# Patient Record
Sex: FEMALE | Race: WHITE | NOT HISPANIC OR LATINO | Employment: STUDENT | URBAN - METROPOLITAN AREA
[De-identification: names, ages, dates, MRNs, and addresses within clinical notes are randomized per-mention and may not be internally consistent; named-entity substitution may affect disease eponyms.]

---
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
## 2020-07-23 ENCOUNTER — OFFICE VISIT (OUTPATIENT)
Dept: FAMILY MEDICINE CLINIC | Facility: CLINIC | Age: 20
End: 2020-07-23

## 2020-07-23 VITALS
HEIGHT: 65 IN | WEIGHT: 119 LBS | BODY MASS INDEX: 19.83 KG/M2 | DIASTOLIC BLOOD PRESSURE: 62 MMHG | SYSTOLIC BLOOD PRESSURE: 114 MMHG | TEMPERATURE: 97.3 F | HEART RATE: 92 BPM | OXYGEN SATURATION: 99 %

## 2020-07-23 DIAGNOSIS — N89.8 VAGINAL DISCHARGE: ICD-10-CM

## 2020-07-23 DIAGNOSIS — R19.5 DARK STOOLS: ICD-10-CM

## 2020-07-23 DIAGNOSIS — Z00.00 ANNUAL PHYSICAL EXAM: Primary | ICD-10-CM

## 2020-07-23 DIAGNOSIS — K59.04 CHRONIC IDIOPATHIC CONSTIPATION: ICD-10-CM

## 2020-07-23 DIAGNOSIS — E55.9 VITAMIN D INSUFFICIENCY: ICD-10-CM

## 2020-07-23 DIAGNOSIS — N93.0 BLEEDING AFTER INTERCOURSE: ICD-10-CM

## 2020-07-23 PROCEDURE — 99395 PREV VISIT EST AGE 18-39: CPT | Performed by: FAMILY MEDICINE

## 2020-07-23 NOTE — PROGRESS NOTES
Subjective   Yessenia Gamez is a 20 y.o. female.     Chief Complaint   Patient presents with   • Annual Exam   • Gynecologic Exam       History of Present Illness   Yessenia presents for annual exam.    She is in a monogamous relationship with  for 2 years. No history of STI's.   She complains of spotting after sexual intercourse which happens consistently ever since she became sexually active two years ago.   Has had one sexual partner.   Denies dyspareunia, however she will typically experience lower abdominal pain/cramping the day after sex. Has regular monthly menstrual cycles. She is on OCP. She and her boyfriend use condoms consistently. She last saw Ob-gyn last year. Last sexual intercourse was one week ago.  She reports light yellow/clear vaginal discharge ranging from thin to thick.    She complains that her stool is very dark brown intermittently since two months ago. For example, yesterday her BM was very dark and she passed another BM that was light brown. She will pass BM every 3-4 days which has been chronic for her. She will strain at times and reports cramping and gas pains intermittently a few times per week. She will take Miralax at times which helps with the constipation.  She eats fruits and veggies in diet. Does not take fiber supplement. She takes probiotics sometimes.     She lives with her mom and dad an at [facility] during school year.     Surgical History:  [place] teeth extraction age 17 yrs    FH:  Mother- thyroid disorder  Grandfather- melanoma removed recently    Past Medical History:   Diagnosis Date   • Allergic    • Healthy adult on routine physical examination      Past Surgical History:   Procedure Laterality Date   • WISDOM TOOTH EXTRACTION  2018     Social History     Tobacco Use   • Smoking status: Never Smoker   • Smokeless tobacco: Never Used   Substance Use Topics   • Alcohol use: No     Frequency: Monthly or less   • Drug use: No     Family History   Problem Relation Age of  "Onset   • Hypothyroidism Mother    • No Known Problems Father    • Breast cancer Paternal Grandmother 65   • Breast cancer Paternal Aunt 40   • Ovarian cancer Neg Hx    • Uterine cancer Neg Hx    • Endometrial cancer Neg Hx    • Colon cancer Neg Hx        Review of Systems   Constitutional: Negative for activity change, appetite change, chills, diaphoresis, fatigue, fever, unexpected weight gain and unexpected weight loss.   Respiratory: Negative for cough, chest tightness, shortness of breath and wheezing.    Cardiovascular: Negative for chest pain, palpitations and leg swelling.   Gastrointestinal: Positive for abdominal pain, constipation (chronic) and GERD. Negative for blood in stool, diarrhea, nausea and vomiting.   Genitourinary: Positive for vaginal bleeding and vaginal discharge. Negative for dyspareunia, dysuria and menstrual problem.   Neurological: Negative for dizziness and headache.       Objective   /62   Pulse 92   Temp 97.3 °F (36.3 °C) (Temporal)   Ht 165.1 cm (65\")   Wt 54 kg (119 lb)   LMP 07/09/2020 (Approximate)   SpO2 99%   BMI 19.80 kg/m²     Physical Exam   Constitutional: She appears well-developed and well-nourished. No distress.   HENT:   Head: Normocephalic and atraumatic.   Right Ear: External ear normal.   Left Ear: External ear normal.   Mouth/Throat: Oropharynx is clear and moist. No oropharyngeal exudate.   Eyes: Conjunctivae are normal.   Neck: Normal range of motion. Neck supple.   Cardiovascular: Normal rate, regular rhythm, normal heart sounds and intact distal pulses.   Pulmonary/Chest: Effort normal and breath sounds normal. No respiratory distress. She has no wheezes. She has no rales.   Abdominal: She exhibits no distension and no mass. There is no tenderness. There is no rebound and no guarding.   Genitourinary: Vagina normal and uterus normal.   Genitourinary Comments:  On speculum exam: entire visualized cervix is friable, bloody, with significant greenish " discharge in the vaginal vault. Patient experienced moderate discomfort with exam. No cervical motion tenderness noted.   Lymphadenopathy:     She has no cervical adenopathy.   Neurological: She is alert.   Skin: Skin is warm and dry.   Psychiatric: She has a normal mood and affect.   Vitals reviewed.      Procedures    Assessment/Plan   Yessenia was seen today for annual exam and gynecologic exam.    Diagnoses and all orders for this visit:    Annual physical exam  -     Comprehensive Metabolic Panel  -     Lipid Panel    Dark stools  -     Cancel: POCT Occult blood x 3, stool  -     POCT Occult blood x 3, stool; Future    Vitamin D insufficiency  -     Vitamin D 25 Hydroxy    Vaginal discharge  -     Wet Prep, Genital - Swab, Vagina  -     Cancel: Chlamydia trachomatis, Neisseria gonorrhoeae, PCR - , Cervix  -     Chlamydia trachomatis, Neisseria gonorrhoeae, PCR - Urine, Urine, Clean Catch    Bleeding after intercourse  -     CBC & Differential  -     Cancel: Chlamydia trachomatis, Neisseria gonorrhoeae, PCR - , Cervix  -     Chlamydia trachomatis, Neisseria gonorrhoeae, PCR - Urine, Urine, Clean Catch    Chronic idiopathic constipation    Other orders  -     Specimen Status Report       Yessenia presents for annual exam today with complaint of chronic vaginal bleeding after intercourse, and vaginal discharge.   She also complains of chronic constipation and dark stools which she has noticed lately.  On exam her cervix is very friable, bloody, with greenish discharge in the vaginal vault. I am concerned about STI's.  Lab work ordered including CBC, CMP, lipids.  Wet prep, GC/Chlamydia testing sent to lab.  FOBT x 3 ordered to assess for blood in stool.   Will contact patient with results and proceed accordingly.  Will recommend HPV vaccination series and consider referral to Ob-gyn.       Patient Instructions    Lab work ordered.  Urine test and vaginal swab performed today.  FOBT ordered- please bring in samples as  discussed.  Will contact you with results and proceed accordingly.

## 2020-07-23 NOTE — PATIENT INSTRUCTIONS
Patient Instructions    Lab work ordered.  Urine test and vaginal swab performed today.  FOBT ordered- please bring in samples as discussed.  Will contact you with results and proceed accordingly.

## 2020-07-24 LAB
25(OH)D3+25(OH)D2 SERPL-MCNC: 44.6 NG/ML (ref 30–100)
ALBUMIN SERPL-MCNC: 4.9 G/DL (ref 3.5–5.2)
ALBUMIN/GLOB SERPL: 1.7 G/DL
ALP SERPL-CCNC: 47 U/L (ref 39–117)
ALT SERPL-CCNC: 9 U/L (ref 1–33)
AST SERPL-CCNC: 12 U/L (ref 1–32)
BASOPHILS # BLD AUTO: 0.04 10*3/MM3 (ref 0–0.2)
BASOPHILS NFR BLD AUTO: 0.6 % (ref 0–1.5)
BILIRUB SERPL-MCNC: 0.2 MG/DL (ref 0–1.2)
BUN SERPL-MCNC: 10 MG/DL (ref 6–20)
BUN/CREAT SERPL: 11.9 (ref 7–25)
CALCIUM SERPL-MCNC: 9.9 MG/DL (ref 8.6–10.5)
CHLORIDE SERPL-SCNC: 103 MMOL/L (ref 98–107)
CHOLEST SERPL-MCNC: 170 MG/DL (ref 0–200)
CLUE CELLS SPEC QL WET PREP: NORMAL
CO2 SERPL-SCNC: 26.7 MMOL/L (ref 22–29)
CREAT SERPL-MCNC: 0.84 MG/DL (ref 0.57–1)
EOSINOPHIL # BLD AUTO: 0.16 10*3/MM3 (ref 0–0.4)
EOSINOPHIL NFR BLD AUTO: 2.4 % (ref 0.3–6.2)
ERYTHROCYTE [DISTWIDTH] IN BLOOD BY AUTOMATED COUNT: 11.8 % (ref 12.3–15.4)
GLOBULIN SER CALC-MCNC: 2.9 GM/DL
GLUCOSE SERPL-MCNC: 93 MG/DL (ref 65–99)
HCT VFR BLD AUTO: 44.1 % (ref 34–46.6)
HDLC SERPL-MCNC: 40 MG/DL (ref 40–60)
HGB BLD-MCNC: 14.5 G/DL (ref 12–15.9)
IMM GRANULOCYTES # BLD AUTO: 0.03 10*3/MM3 (ref 0–0.05)
IMM GRANULOCYTES NFR BLD AUTO: 0.4 % (ref 0–0.5)
LDLC SERPL CALC-MCNC: 113 MG/DL (ref 0–100)
LYMPHOCYTES # BLD AUTO: 1.91 10*3/MM3 (ref 0.7–3.1)
LYMPHOCYTES NFR BLD AUTO: 28.6 % (ref 19.6–45.3)
MCH RBC QN AUTO: 28.2 PG (ref 26.6–33)
MCHC RBC AUTO-ENTMCNC: 32.9 G/DL (ref 31.5–35.7)
MCV RBC AUTO: 85.6 FL (ref 79–97)
MONOCYTES # BLD AUTO: 0.59 10*3/MM3 (ref 0.1–0.9)
MONOCYTES NFR BLD AUTO: 8.8 % (ref 5–12)
NEUTROPHILS # BLD AUTO: 3.96 10*3/MM3 (ref 1.7–7)
NEUTROPHILS NFR BLD AUTO: 59.2 % (ref 42.7–76)
NRBC BLD AUTO-RTO: 0 /100 WBC (ref 0–0.2)
PLATELET # BLD AUTO: 323 10*3/MM3 (ref 140–450)
POTASSIUM SERPL-SCNC: 5.4 MMOL/L (ref 3.5–5.2)
PROT SERPL-MCNC: 7.8 G/DL (ref 6–8.5)
RBC # BLD AUTO: 5.15 10*6/MM3 (ref 3.77–5.28)
SODIUM SERPL-SCNC: 139 MMOL/L (ref 136–145)
SPECIMEN STATUS: NORMAL
T VAGINALIS SPEC QL WET PREP: NORMAL
TRIGL SERPL-MCNC: 87 MG/DL (ref 0–150)
VLDLC SERPL CALC-MCNC: 17.4 MG/DL
WBC # BLD AUTO: 6.69 10*3/MM3 (ref 3.4–10.8)
YEAST SPEC QL WET PREP: NORMAL

## 2020-07-25 LAB
C TRACH RRNA SPEC QL NAA+PROBE: NEGATIVE
N GONORRHOEA RRNA SPEC QL NAA+PROBE: NEGATIVE

## 2020-07-27 ENCOUNTER — TELEPHONE (OUTPATIENT)
Dept: FAMILY MEDICINE CLINIC | Facility: CLINIC | Age: 20
End: 2020-07-27

## 2020-07-27 DIAGNOSIS — Z00.00 HEALTHCARE MAINTENANCE: ICD-10-CM

## 2020-07-27 NOTE — TELEPHONE ENCOUNTER
Pt has been called and advised was tested a different was through urine and not swab. Pt understood and will await results.

## 2020-07-27 NOTE — TELEPHONE ENCOUNTER
Caller: Yessenia Gamez    Relationship: Self    Best call back number: [phone]    Medication needed:   Requested Prescriptions     Pending Prescriptions Disp Refills   • norethindrone-ethinyl estradiol (Nortrel 1/35, 28,) 1-35 MG-MCG per tablet 28 tablet 12     Sig: Take 1 tablet by mouth Daily.       When do you need the refill by: W/IN MONTH    What details did the patient provide when requesting the medication: PT WAS SEEN Thursday, BUT FORGOT TO REQUEST A NEW PRESCRIPTION FOR HER BIRTH CONTROL. PLEASE SEND OVER.    Does the patient have less than a 3 day supply:  [] Yes  [x] No    What is the patient's preferred pharmacy:      [facility] - [facility] [address] [place] [address] [phone] [facility] [phone]   [phone]

## 2020-07-27 NOTE — TELEPHONE ENCOUNTER
Caller: ZO COPELAND    Relationship: SELF    Best call back number: [phone]      What test was performed: VAGINAL SWAB    When was the test performed: THURSDAY    Where was the test performed: IN OFFICE    Additional notes: RESULTS CANCELLED IN MYCHART, PLEASE CHECK ON RESULTS FROM SWAB AND CALL TO ADVISE PT

## 2020-07-29 ENCOUNTER — TELEPHONE (OUTPATIENT)
Dept: FAMILY MEDICINE CLINIC | Facility: CLINIC | Age: 20
End: 2020-07-29

## 2020-07-29 DIAGNOSIS — R19.5 DARK STOOLS: Primary | ICD-10-CM

## 2020-07-29 PROCEDURE — 82274 ASSAY TEST FOR BLOOD FECAL: CPT | Performed by: FAMILY MEDICINE

## 2020-07-29 NOTE — PROGRESS NOTES
Spoke with patient regarding test results. The lab cancelled the wet prep- I am unsure why because the correct tube was sent. Wet prep is being done now and results should be ready tomorrow. All other labs are normal/negative. Will contact patient with lab results. Will plan to place Ob-gyn referral at that time. She is on board with this plan.

## 2020-07-31 DIAGNOSIS — N93.0 PCB (POST COITAL BLEEDING): ICD-10-CM

## 2020-07-31 DIAGNOSIS — N89.8 VAGINAL DISCHARGE: Primary | ICD-10-CM

## 2020-08-01 LAB
A VAGINAE DNA VAG QL NAA+PROBE: NORMAL SCORE
BVAB2 DNA VAG QL NAA+PROBE: NORMAL SCORE
C ALBICANS DNA VAG QL NAA+PROBE: NEGATIVE
C GLABRATA DNA VAG QL NAA+PROBE: NEGATIVE
MEGA1 DNA VAG QL NAA+PROBE: NORMAL SCORE
T VAGINALIS DNA VAG QL NAA+PROBE: NEGATIVE
WRITTEN AUTHORIZATION: NORMAL

## 2020-08-26 ENCOUNTER — CLINICAL SUPPORT (OUTPATIENT)
Dept: FAMILY MEDICINE CLINIC | Facility: CLINIC | Age: 20
End: 2020-08-26

## 2020-08-26 ENCOUNTER — TELEPHONE (OUTPATIENT)
Dept: FAMILY MEDICINE CLINIC | Facility: CLINIC | Age: 20
End: 2020-08-26

## 2020-08-26 DIAGNOSIS — R19.5 DARK STOOLS: Primary | ICD-10-CM

## 2020-08-26 NOTE — PROGRESS NOTES
2nd IFOB received. Negative. Waiting for last IFOB. Left open to edit.   Lot number: [id number]   Exp: 4/30/2021

## 2020-09-08 LAB
EXPIRATION DATE 2: NORMAL
EXPIRATION DATE 3: NORMAL
EXPIRATION DATE: NORMAL
GASTROCULT GAST QL: NEGATIVE
HEMOCCULT SP2 STL QL: NEGATIVE
HEMOCCULT SP3 STL QL: NEGATIVE
Lab: NORMAL

## 2020-10-16 ENCOUNTER — OFFICE VISIT (OUTPATIENT)
Dept: FAMILY MEDICINE CLINIC | Facility: CLINIC | Age: 20
End: 2020-10-16

## 2020-10-16 VITALS
SYSTOLIC BLOOD PRESSURE: 130 MMHG | DIASTOLIC BLOOD PRESSURE: 80 MMHG | TEMPERATURE: 97.3 F | HEIGHT: 63 IN | OXYGEN SATURATION: 99 % | RESPIRATION RATE: 16 BRPM | HEART RATE: 107 BPM | BODY MASS INDEX: 21.44 KG/M2 | WEIGHT: 121 LBS

## 2020-10-16 DIAGNOSIS — R10.30 LOWER ABDOMINAL PAIN: Primary | ICD-10-CM

## 2020-10-16 DIAGNOSIS — N39.0 RECURRENT UTI: ICD-10-CM

## 2020-10-16 DIAGNOSIS — N30.01 ACUTE CYSTITIS WITH HEMATURIA: ICD-10-CM

## 2020-10-16 LAB
BILIRUB BLD-MCNC: NEGATIVE MG/DL
CLARITY, POC: ABNORMAL
COLOR UR: ABNORMAL
GLUCOSE UR STRIP-MCNC: NEGATIVE MG/DL
KETONES UR QL: NEGATIVE
LEUKOCYTE EST, POC: ABNORMAL
NITRITE UR-MCNC: NEGATIVE MG/ML
PH UR: 6 [PH] (ref 5–8)
PROT UR STRIP-MCNC: NEGATIVE MG/DL
RBC # UR STRIP: ABNORMAL /UL
SP GR UR: 1.02 (ref 1–1.03)
UROBILINOGEN UR QL: NORMAL

## 2020-10-16 PROCEDURE — 81003 URINALYSIS AUTO W/O SCOPE: CPT | Performed by: FAMILY MEDICINE

## 2020-10-16 PROCEDURE — 99213 OFFICE O/P EST LOW 20 MIN: CPT | Performed by: FAMILY MEDICINE

## 2020-10-16 RX ORDER — SULFAMETHOXAZOLE AND TRIMETHOPRIM 800; 160 MG/1; MG/1
1 TABLET ORAL 2 TIMES DAILY
Qty: 10 TABLET | Refills: 0 | Status: SHIPPED | OUTPATIENT
Start: 2020-10-16 | End: 2020-10-21

## 2020-10-16 NOTE — PATIENT INSTRUCTIONS
Patient Instructions    Urine culture ordered- will contact you with results.  Bactrim prescribed.  Referral placed for urology- office will call you with results.  Call or return to clinic if symptoms worsen or fail to resolve.

## 2020-10-16 NOTE — PROGRESS NOTES
"Subjective   Yessenia Gamez is a 20 y.o. female.     Chief Complaint   Patient presents with   • Abdominal Pain     lower abdominal pain- patient thinks she may have a UTI        History of Present Illness   Pain started this morning at 8am, came on all of a sudden after she urinated this morning. Began in the lower abdomen- similar to recent UTI symptoms for which she was treated 9/30. Sharp pain at 6-7/10. Since then The pain has become more diffuse, dull and including achey low back pain.   After leaving urine sample in the office she feels a lot better, not completely but feels relief. Denies dysuria, gross hematuria. She has been trying to drink more because she gets UTI's frequently (5-6 x per year)- for the past 2-3 years- however she usually has dysuria, urgency except not now or with the last UTI.   She is sexually active with her boyfriend- for the past 6 years. She takes OCP, and he uses condom consistently. No history of STI's. No vaginal discharge.  She urinates after intercourse regularly.   She had to reschedule her apt with Ob-gyn.  She is not menstruating currently- should start in 5 days.   No fevers. Before this morning she was feeling \"perfectly normal\".   Previous urine cultures from last year reviewed and were negative.     Past Medical History:   Diagnosis Date   • Allergic    • Healthy adult on routine physical examination      Past Surgical History:   Procedure Laterality Date   • WISDOM TOOTH EXTRACTION  2018     Social History     Tobacco Use   • Smoking status: Never Smoker   • Smokeless tobacco: Never Used   Substance Use Topics   • Alcohol use: No     Frequency: Monthly or less   • Drug use: No     Family History   Problem Relation Age of Onset   • Hypothyroidism Mother    • No Known Problems Father    • Breast cancer Paternal Grandmother 65   • Breast cancer Paternal Aunt 40   • Ovarian cancer Neg Hx    • Uterine cancer Neg Hx    • Endometrial cancer Neg Hx    • Colon cancer Neg Hx  " "      Review of Systems   Constitutional: Negative for activity change, appetite change, chills, fatigue, fever, unexpected weight gain and unexpected weight loss.   HENT: Negative for trouble swallowing.    Respiratory: Negative for cough, chest tightness, shortness of breath and wheezing.    Cardiovascular: Negative for chest pain and leg swelling.   Gastrointestinal: Negative for abdominal pain, constipation, diarrhea, nausea and vomiting.   Genitourinary: Positive for flank pain, pelvic pain and pelvic pressure. Negative for difficulty urinating, dyspareunia, dysuria, frequency, genital sores, hematuria, menstrual problem, urgency and vaginal discharge.       Objective   /80   Pulse 107   Temp 97.3 °F (36.3 °C)   Resp 16   Ht 160 cm (63\")   Wt 54.9 kg (121 lb)   LMP 09/21/2020   SpO2 99%   BMI 21.43 kg/m²     Physical Exam  Vitals signs reviewed.   Constitutional:       General: She is not in acute distress.     Appearance: Normal appearance. She is well-developed and normal weight. She is not ill-appearing.   HENT:      Head: Normocephalic and atraumatic.      Right Ear: External ear normal.      Left Ear: External ear normal.      Nose: Nose normal.      Mouth/Throat:      Mouth: Mucous membranes are moist.      Pharynx: Oropharynx is clear. No oropharyngeal exudate.   Eyes:      Conjunctiva/sclera: Conjunctivae normal.   Neck:      Musculoskeletal: Normal range of motion and neck supple.   Cardiovascular:      Rate and Rhythm: Normal rate and regular rhythm.      Heart sounds: Normal heart sounds.   Pulmonary:      Effort: Pulmonary effort is normal. No respiratory distress.      Breath sounds: Normal breath sounds. No wheezing, rhonchi or rales.   Abdominal:      General: Abdomen is flat. Bowel sounds are normal. There is no distension.      Palpations: Abdomen is soft.      Tenderness: There is no abdominal tenderness. There is no right CVA tenderness, left CVA tenderness, guarding or rebound. "   Musculoskeletal:      Right lower leg: No edema.      Left lower leg: No edema.   Skin:     General: Skin is warm and dry.   Neurological:      Mental Status: She is alert.   Psychiatric:         Mood and Affect: Mood normal.         Procedures    Assessment/Plan   Diagnoses and all orders for this visit:    1. Lower abdominal pain (Primary)  -     POC Urinalysis Dipstick, Automated    2. Acute cystitis with hematuria  -     sulfamethoxazole-trimethoprim (Bactrim DS) 800-160 MG per tablet; Take 1 tablet by mouth 2 (Two) Times a Day for 5 days.  Dispense: 10 tablet; Refill: 0  -     Urinalysis With Microscopic - Urine, Clean Catch  -     Urine Culture - Urine, Urine, Clean Catch    3. Recurrent UTI  -     Ambulatory Referral to Urology    Urine dip stick in office- trace leukocytes and trace blood- not convinced of infection. Upon review of previous urine cultures- no positive culture available. I wonder if she may have interstitial cystitis.  Urine culture ordered- will contact patient with results.  Bactrim prescribed.  Referral placed for urology- assistance appreciated.  Instructed patient to call or return to clinic if symptoms worsen or fail to resolve.        Patient Instructions    Urine culture ordered- will contact you with results.  Bactrim prescribed.  Referral placed for urology- office will call you with results.  Call or return to clinic if symptoms worsen or fail to resolve.

## 2020-10-18 LAB
APPEARANCE UR: CLEAR
BACTERIA #/AREA URNS HPF: ABNORMAL /HPF
BACTERIA UR CULT: NO GROWTH
BACTERIA UR CULT: NORMAL
BILIRUB UR QL STRIP: NEGATIVE
COLOR UR: YELLOW
EPI CELLS #/AREA URNS HPF: ABNORMAL /HPF
GLUCOSE UR QL: NEGATIVE
HGB UR QL STRIP: NEGATIVE
KETONES UR QL STRIP: NEGATIVE
LEUKOCYTE ESTERASE UR QL STRIP: ABNORMAL
NITRITE UR QL STRIP: NEGATIVE
PH UR STRIP: 6 [PH] (ref 5–8)
PROT UR QL STRIP: NEGATIVE
RBC #/AREA URNS HPF: ABNORMAL /HPF
SP GR UR: 1.01 (ref 1–1.03)
UROBILINOGEN UR STRIP-MCNC: ABNORMAL MG/DL
WBC #/AREA URNS HPF: ABNORMAL /HPF

## 2020-10-26 NOTE — PROGRESS NOTES
Peg,     Could you please call Yessenia and let her know her urinalysis was negative for UTI? How is she feeling currently? Thank you!

## 2021-04-16 ENCOUNTER — BULK ORDERING (OUTPATIENT)
Dept: CASE MANAGEMENT | Facility: OTHER | Age: 21
End: 2021-04-16

## 2021-04-16 DIAGNOSIS — Z23 IMMUNIZATION DUE: ICD-10-CM

## 2021-05-21 PROCEDURE — 87086 URINE CULTURE/COLONY COUNT: CPT | Performed by: NURSE PRACTITIONER

## 2021-05-24 ENCOUNTER — TELEPHONE (OUTPATIENT)
Dept: URGENT CARE | Facility: CLINIC | Age: 21
End: 2021-05-24

## 2021-05-25 ENCOUNTER — TELEPHONE (OUTPATIENT)
Dept: URGENT CARE | Facility: CLINIC | Age: 21
End: 2021-05-25

## 2021-05-31 ENCOUNTER — HOSPITAL ENCOUNTER (EMERGENCY)
Facility: HOSPITAL | Age: 21
Discharge: HOME | End: 2021-05-31
Attending: EMERGENCY MEDICINE | Admitting: EMERGENCY MEDICINE

## 2021-05-31 VITALS
SYSTOLIC BLOOD PRESSURE: 104 MMHG | TEMPERATURE: 98.3 F | OXYGEN SATURATION: 100 % | RESPIRATION RATE: 18 BRPM | HEART RATE: 88 BPM | DIASTOLIC BLOOD PRESSURE: 66 MMHG

## 2021-05-31 DIAGNOSIS — L50.9 URTICARIA: Primary | ICD-10-CM

## 2021-05-31 PROCEDURE — 99282 EMERGENCY DEPT VISIT SF MDM: CPT

## 2021-05-31 PROCEDURE — 25010000002 METHYLPREDNISOLONE PER 125 MG: Performed by: EMERGENCY MEDICINE

## 2021-05-31 PROCEDURE — 96374 THER/PROPH/DIAG INJ IV PUSH: CPT

## 2021-05-31 RX ORDER — METHYLPREDNISOLONE 4 MG/1
TABLET ORAL
Qty: 21 TABLET | Refills: 0 | Status: SHIPPED | OUTPATIENT
Start: 2021-05-31 | End: 2021-12-06

## 2021-05-31 RX ORDER — METHYLPREDNISOLONE 4 MG/1
TABLET ORAL
Qty: 21 TABLET | Refills: 0 | Status: SHIPPED | OUTPATIENT
Start: 2021-05-31 | End: 2021-05-31 | Stop reason: SDUPTHER

## 2021-05-31 RX ORDER — METHYLPREDNISOLONE SODIUM SUCCINATE 125 MG/2ML
125 INJECTION, POWDER, LYOPHILIZED, FOR SOLUTION INTRAMUSCULAR; INTRAVENOUS ONCE
Status: COMPLETED | OUTPATIENT
Start: 2021-05-31 | End: 2021-05-31

## 2021-05-31 RX ADMIN — METHYLPREDNISOLONE SODIUM SUCCINATE 125 MG: 125 INJECTION, POWDER, FOR SOLUTION INTRAMUSCULAR; INTRAVENOUS at 12:54

## 2021-07-29 ENCOUNTER — TELEPHONE (OUTPATIENT)
Dept: OBSTETRICS AND GYNECOLOGY | Facility: CLINIC | Age: 21
End: 2021-07-29

## 2021-07-29 DIAGNOSIS — Z00.00 HEALTHCARE MAINTENANCE: ICD-10-CM

## 2021-07-29 RX ORDER — NORETHINDRONE AND ETHINYL ESTRADIOL 1 MG-35MCG
1 KIT ORAL DAILY
Qty: 28 TABLET | Refills: 0 | Status: SHIPPED | OUTPATIENT
Start: 2021-07-29 | End: 2021-08-27 | Stop reason: SDUPTHER

## 2021-07-30 NOTE — TELEPHONE ENCOUNTER
Attempted to contact patient and there was no answer.  Message was left for her to give the office a call back.

## 2021-08-27 DIAGNOSIS — Z00.00 HEALTHCARE MAINTENANCE: ICD-10-CM

## 2021-08-27 RX ORDER — NORETHINDRONE AND ETHINYL ESTRADIOL 1 MG-35MCG
1 KIT ORAL DAILY
Qty: 28 TABLET | Refills: 2 | Status: SHIPPED | OUTPATIENT
Start: 2021-08-27 | End: 2021-09-24 | Stop reason: SDUPTHER

## 2021-08-27 NOTE — TELEPHONE ENCOUNTER
Patient called and is needing a refill on her birth control sent to her pharmacy.  She has an appointment scheduled in December.

## 2021-09-24 ENCOUNTER — TELEPHONE (OUTPATIENT)
Dept: OBSTETRICS AND GYNECOLOGY | Facility: CLINIC | Age: 21
End: 2021-09-24

## 2021-09-24 DIAGNOSIS — Z00.00 HEALTHCARE MAINTENANCE: ICD-10-CM

## 2021-09-24 RX ORDER — NORETHINDRONE AND ETHINYL ESTRADIOL 1 MG-35MCG
1 KIT ORAL DAILY
Qty: 28 TABLET | Refills: 2 | Status: SHIPPED | OUTPATIENT
Start: 2021-09-24 | End: 2021-10-22 | Stop reason: SDUPTHER

## 2021-10-22 RX ORDER — NORETHINDRONE AND ETHINYL ESTRADIOL 1 MG-35MCG
1 KIT ORAL DAILY
Qty: 28 TABLET | Refills: 2 | Status: SHIPPED | OUTPATIENT
Start: 2021-10-22 | End: 2021-11-19 | Stop reason: SDUPTHER

## 2021-11-19 ENCOUNTER — TELEPHONE (OUTPATIENT)
Dept: OBSTETRICS AND GYNECOLOGY | Facility: CLINIC | Age: 21
End: 2021-11-19

## 2021-11-19 DIAGNOSIS — Z00.00 HEALTHCARE MAINTENANCE: ICD-10-CM

## 2021-11-19 RX ORDER — NORETHINDRONE AND ETHINYL ESTRADIOL 1 MG-35MCG
1 KIT ORAL DAILY
Qty: 28 TABLET | Refills: 0 | Status: SHIPPED | OUTPATIENT
Start: 2021-11-19 | End: 2021-12-06 | Stop reason: ALTCHOICE

## 2021-11-19 NOTE — TELEPHONE ENCOUNTER
Patient was calling to have her birth control refilled.  She is scheduled in Dec but needs some to carry her over until she sees the provider in office.  If any questions you can contact patient.     norethindrone-ethinyl estradiol

## 2021-12-06 ENCOUNTER — OFFICE VISIT (OUTPATIENT)
Dept: OBSTETRICS AND GYNECOLOGY | Facility: CLINIC | Age: 21
End: 2021-12-06

## 2021-12-06 VITALS
SYSTOLIC BLOOD PRESSURE: 110 MMHG | RESPIRATION RATE: 16 BRPM | WEIGHT: 129 LBS | DIASTOLIC BLOOD PRESSURE: 60 MMHG | BODY MASS INDEX: 22.85 KG/M2

## 2021-12-06 DIAGNOSIS — Z20.2 POSSIBLE EXPOSURE TO STD: ICD-10-CM

## 2021-12-06 DIAGNOSIS — Z30.41 ENCOUNTER FOR SURVEILLANCE OF CONTRACEPTIVE PILLS: ICD-10-CM

## 2021-12-06 DIAGNOSIS — Z01.419 ENCOUNTER FOR WELL WOMAN EXAM WITH ROUTINE GYNECOLOGICAL EXAM: Primary | ICD-10-CM

## 2021-12-06 PROCEDURE — 99395 PREV VISIT EST AGE 18-39: CPT | Performed by: NURSE PRACTITIONER

## 2021-12-06 RX ORDER — AZELASTINE HCL 205.5 UG/1
SPRAY NASAL
COMMUNITY
Start: 2021-11-04

## 2021-12-06 NOTE — PROGRESS NOTES
Annual Visit     Patient Name: Yessenia Gamez  : 2000   MRN: [id number]   Care Team: Patient Care Team:  Alena Olvera MD as PCP - General (Family Medicine)  Provider, No Known as PCP - Family Medicine  Deb Yadav DO (Inactive) as Consulting Physician (Obstetrics and Gynecology)    Chief Complaint:    Chief Complaint   Patient presents with   • Annual Exam       HPI: Yessenia Gamez is a 21 y.o. year old  presenting to be seen for her gynecologic exam.   Hasn't had a pap smear yet     Doing well with nortrel 1/35 COCs except she has noticed some vaginal dryness with method   Asking if there would be other pill options that might be a better fit for her     Attending [employer] - will graduate in may 2022   Plans to attend medical school at  after     Subjective      /60   Resp 16   Wt 58.5 kg (129 lb)   LMP 2021   BMI 22.85 kg/m²     BMI reviewed: Body mass index is 22.85 kg/m².      Objective     Physical Exam    Neuro: alert and oriented to person, place and time   General:  alert; cooperative; well developed; well nourished   Skin:  No suspicious lesions seen   Thyroid: normal to inspection and palpation   Lungs:  breathing is unlabored  clear to auscultation bilaterally   Heart:  regular rate and rhythm, S1, S2 normal, no murmur, click, rub or gallop  normal apical impulse   Breasts:  Examined in supine position  Symmetric without masses or skin dimpling  Nipples normal without inversion, lesions or discharge  There are no palpable axillary nodes  Fibrocystic changes are present both breasts without a discrete mass   Abdomen: soft, non-tender; no masses  no umbilical or inguinal hernias are present  no hepato-splenomegaly   Pelvis: Clinical staff was present for exam  External genitalia:  normal appearance of the external genitalia including Bartholin's and [place]'s glands.  :  urethral meatus normal;  Vaginal:  normal pink mucosa without prolapse or lesions.  Cervix:   normal appearance. friable;  Uterus:  normal size, shape and consistency.  Adnexa:  normal bimanual exam of the adnexa.  Rectal:  digital rectal exam not performed; anus visually normal appearing.     Cervix treated with silver nitrate   She does report postcoital spotting    Assessment / Plan      Assessment  Problems Addressed This Visit    ICD-10-CM ICD-9-CM   1. Encounter for well woman exam with routine gynecological exam  Z01.419 V72.31   2. Possible exposure to STD  Z20.2 V01.6   3. Encounter for surveillance of contraceptive pills  Z30.41 V25.41       Plan    Pap smear pending   STD screening pending   Discussed cervix treated with silver nitrate d/t friability   If postcoital bldg continues, she will let me know     No C/is to cont COCs   She will complete current pill pkg then trial of Balcoltra - samples x 2 given   She will call to let me know how she is doing before 2nd month completed   Reviewed expected bldg pattern with method   Discussed vaginal dryness may worsen with lower dose of estrogen, but she desires to cont with trial   Lubricants with intercourse if needed     AV 1 yr             Follow Up  Return in about 1 year (around 12/6/2022) for Annual physical.  Patient was given instructions and counseling regarding her condition or for health maintenance advice. Please see specific information pulled into the AVS if appropriate.     Jennifer Vázquez, VÍCTOR  December 6, 2021  15:18 EST

## 2021-12-07 ENCOUNTER — TELEPHONE (OUTPATIENT)
Dept: OBSTETRICS AND GYNECOLOGY | Facility: CLINIC | Age: 21
End: 2021-12-07

## 2022-02-07 ENCOUNTER — TELEPHONE (OUTPATIENT)
Dept: OBSTETRICS AND GYNECOLOGY | Facility: CLINIC | Age: 22
End: 2022-02-07

## 2022-02-07 NOTE — TELEPHONE ENCOUNTER
PT CALLING WAS GIVEN SAMPLE OF B/C ON LAST VISIT - AND REALLY LIKES THEM AND IS ASKING FOR A PRESCRIPTION TO BE CALLED IN -PLEASE ADVISE HER IF UNABLE

## 2022-02-08 ENCOUNTER — TELEPHONE (OUTPATIENT)
Dept: OBSTETRICS AND GYNECOLOGY | Facility: CLINIC | Age: 22
End: 2022-02-08

## 2022-02-08 RX ORDER — LEVONORGESTREL AND ETHINYL ESTRADIOL 0.1-0.02MG
1 KIT ORAL DAILY
Qty: 28 TABLET | Refills: 12 | Status: SHIPPED | OUTPATIENT
Start: 2022-02-08 | End: 2022-10-11

## 2022-04-15 ENCOUNTER — OFFICE VISIT (OUTPATIENT)
Dept: FAMILY MEDICINE CLINIC | Facility: CLINIC | Age: 22
End: 2022-04-15

## 2022-04-15 VITALS
DIASTOLIC BLOOD PRESSURE: 64 MMHG | SYSTOLIC BLOOD PRESSURE: 100 MMHG | HEIGHT: 63 IN | BODY MASS INDEX: 22.79 KG/M2 | TEMPERATURE: 97.7 F | WEIGHT: 128.6 LBS | HEART RATE: 86 BPM | OXYGEN SATURATION: 99 %

## 2022-04-15 DIAGNOSIS — Z11.9 ENCOUNTER FOR SCREENING EXAMINATION FOR INFECTIOUS DISEASE: ICD-10-CM

## 2022-04-15 DIAGNOSIS — Z11.59 NEED FOR HEPATITIS B SCREENING TEST: ICD-10-CM

## 2022-04-15 DIAGNOSIS — Z11.1 SCREENING FOR TUBERCULOSIS: ICD-10-CM

## 2022-04-15 DIAGNOSIS — Z23 ENCOUNTER FOR IMMUNIZATION: ICD-10-CM

## 2022-04-15 DIAGNOSIS — Z11.59 ENCOUNTER FOR HEPATITIS C SCREENING TEST FOR LOW RISK PATIENT: ICD-10-CM

## 2022-04-15 DIAGNOSIS — Z00.00 ANNUAL PHYSICAL EXAM: Primary | ICD-10-CM

## 2022-04-15 PROCEDURE — 90471 IMMUNIZATION ADMIN: CPT | Performed by: NURSE PRACTITIONER

## 2022-04-15 PROCEDURE — 90715 TDAP VACCINE 7 YRS/> IM: CPT | Performed by: NURSE PRACTITIONER

## 2022-04-15 PROCEDURE — 90472 IMMUNIZATION ADMIN EACH ADD: CPT | Performed by: NURSE PRACTITIONER

## 2022-04-15 PROCEDURE — 99395 PREV VISIT EST AGE 18-39: CPT | Performed by: NURSE PRACTITIONER

## 2022-04-15 PROCEDURE — 90651 9VHPV VACCINE 2/3 DOSE IM: CPT | Performed by: NURSE PRACTITIONER

## 2022-04-15 RX ORDER — AMOXICILLIN 875 MG/1
TABLET, COATED ORAL
COMMUNITY
Start: 2022-04-11 | End: 2022-10-11

## 2022-04-15 RX ORDER — CETIRIZINE HYDROCHLORIDE 10 MG/1
10 TABLET ORAL DAILY
COMMUNITY

## 2022-04-15 NOTE — PROGRESS NOTES
"Chief Complaint  Establish Care and ear infection (On antibiotics since monday)    Subjective          Yessenia Gamez presents to [facility]  Yessenia presents to establish care. She was diagnosed with an ear infection on Monday and started amoxicillin. She states she is toelerating this well, however both ears are still bothering her. She is taking zyrtec, astelin prn, and flonase and tolerating those well. Increased headaches related to allergies more recently.     She is established with gyn and pap is up to date. She states she was advised she had a friable cervix. She is taking OCP and tolerating well, prescribed by gyn.     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 She is required to have vaccines up to date. She is due HPV, she had one vaccine in the past and would like to finish this series. She is also due a tdap and agreeable to get this today. She needs proof of immunity to Hep B, MMR, varicella and also needs screening for TB.   Earache   There is pain in both ears. This is a new problem. The current episode started in the past 7 days. The problem has been gradually improving. There has been no fever. The pain is mild. Associated symptoms include headaches and rhinorrhea. Pertinent negatives include no abdominal pain, coughing, diarrhea, ear discharge, hearing loss, neck pain, rash, sore throat or vomiting. She has tried antibiotics for the symptoms. The treatment provided mild relief.       Objective   Vital Signs:   /64 (BP Location: Left arm, Patient Position: Sitting, Cuff Size: Adult)   Pulse 86   Temp 97.7 °F (36.5 °C) (Temporal)   Ht 160 cm (62.99\")   Wt 58.3 kg (128 lb 9.6 oz)   SpO2 99%   BMI 22.79 kg/m²     BMI is within normal parameters. No follow-up required.      Physical Exam  Vitals reviewed.   HENT:      Right Ear: A middle ear effusion is present. Tympanic membrane is not erythematous or " bulging.      Left Ear: A middle ear effusion is present. Tympanic membrane is not erythematous or bulging.      Nose: Nose normal.      Mouth/Throat:      Mouth: Mucous membranes are moist.      Pharynx: Oropharynx is clear.   Eyes:      Conjunctiva/sclera: Conjunctivae normal.      Pupils: Pupils are equal, round, and reactive to light.   Pulmonary:      Effort: Pulmonary effort is normal. No respiratory distress.      Breath sounds: Normal breath sounds. No wheezing, rhonchi or rales.   Abdominal:      General: Bowel sounds are normal. There is no distension.      Palpations: Abdomen is soft.      Tenderness: There is no abdominal tenderness.      Hernia: No hernia is present.   Skin:     General: Skin is warm and dry.   Neurological:      Mental Status: She is oriented to person, place, and time.   Psychiatric:         Mood and Affect: Mood normal.        Result Review :                 Assessment and Plan    Diagnoses and all orders for this visit:    1. Annual physical exam (Primary)    2. Encounter for hepatitis C screening test for low risk patient  -     Hepatitis C Antibody    3. Screening for tuberculosis  -     QuantiFERON TB Gold    4. Encounter for screening examination for infectious disease  -     Varicella Zoster Antibodies, IgG / IgM  -     Measles / Mumps / Rubella Immunity    5. Need for hepatitis B screening test  -     Hepatitis B Surface Antibody    6. Encounter for immunization  -     HPV Vaccine (HPV9)    Other orders  -     Tdap Vaccine Greater Than or Equal To 8yo IM        Follow Up   No follow-ups on file.  Patient was given instructions and counseling regarding her condition or for health maintenance advice. Please see specific information pulled into the AVS if appropriate.       Information/counseling provided to the patient regarding periodic lonny maintenance recommendations, including but not limited to immunizations, diet/exercise/healthy lifestyle, laboratory, and other  screenings. BMI is discussed. Appropriate exercise, diet, and weight plans are discussed.    Pap is up to date    hpv vax and tdap today  Will monitor antibodies in Hep B, varicella and MMR  quantiferon as ordered    Otalgia: no evidence of infection, recommend to continue abx, there is moderate fluid level, however she will continue flonase and advised this times time to resolve  She will follow up as needed

## 2022-04-19 LAB
GAMMA INTERFERON BACKGROUND BLD IA-ACNC: 0.02 IU/ML
HBV SURFACE AB SER QL: NON REACTIVE
HCV AB S/CO SERPL IA: <0.1 S/CO RATIO (ref 0–0.9)
M TB IFN-G BLD-IMP: NEGATIVE
M TB IFN-G CD4+ BCKGRND COR BLD-ACNC: 0.05 IU/ML
M TB IFN-G CD4+CD8+ BCKGRND COR BLD-ACNC: 0.03 IU/ML
MEV IGG SER IA-ACNC: >300 AU/ML
MITOGEN IGNF BLD-ACNC: >10 IU/ML
MUV IGG SER IA-ACNC: 228 AU/ML
QUANTIFERON INCUBATION: NORMAL
RUBV IGG SERPL IA-ACNC: 9.55 INDEX
SERVICE CMNT-IMP: NORMAL
VZV IGG SER IA-ACNC: 1967 INDEX
VZV IGM SER IA-ACNC: <0.91 INDEX (ref 0–0.9)

## 2022-05-05 ENCOUNTER — TELEPHONE (OUTPATIENT)
Dept: FAMILY MEDICINE CLINIC | Facility: CLINIC | Age: 22
End: 2022-05-05

## 2022-05-05 ENCOUNTER — CLINICAL SUPPORT (OUTPATIENT)
Dept: FAMILY MEDICINE CLINIC | Facility: CLINIC | Age: 22
End: 2022-05-05

## 2022-05-05 DIAGNOSIS — Z23 NEED FOR VACCINATION: Primary | ICD-10-CM

## 2022-05-05 PROCEDURE — 90746 HEPB VACCINE 3 DOSE ADULT IM: CPT | Performed by: NURSE PRACTITIONER

## 2022-05-05 PROCEDURE — 90471 IMMUNIZATION ADMIN: CPT | Performed by: NURSE PRACTITIONER

## 2022-05-12 ENCOUNTER — TELEPHONE (OUTPATIENT)
Dept: FAMILY MEDICINE CLINIC | Facility: CLINIC | Age: 22
End: 2022-05-12

## 2022-05-12 NOTE — TELEPHONE ENCOUNTER
Caller: YaritzaYessenia lópez    Relationship: Self    Best call back number: [phone]     What is the best time to reach you: ANY     Who are you requesting to speak with (clinical staff, provider,  specific staff member): CLINICAL STAFF     Do you know the name of the person who called: PATIENT     What was the call regarding: PATIENT WANTED TO KNOW IS SHE CAN GET BOTH 2ND HPV AND THE HEB B SHOT ON 6/2/2022 GIVEN THE TIMING  . PLEASE CALL AND ADVISE .     Do you require a callback: YES

## 2022-05-12 NOTE — TELEPHONE ENCOUNTER
She has already had her first dose heb, and then her HPV 2wks aftet, Shes asking can she get her last two together?

## 2022-05-13 ENCOUNTER — TELEPHONE (OUTPATIENT)
Dept: FAMILY MEDICINE CLINIC | Facility: CLINIC | Age: 22
End: 2022-05-13

## 2022-05-13 NOTE — TELEPHONE ENCOUNTER
Patient got these,    HPV 04/16  HEB B 2wks ago 05/03/22.    June 2 HEB B is scheduled for 2nd Heb B    Appt is  scheduled Monday HPV

## 2022-05-13 NOTE — TELEPHONE ENCOUNTER
Rx Refill Note  Requested Prescriptions      No prescriptions requested or ordered in this encounter      Last office visit with prescribing clinician: 4/15/2022      Next office visit with prescribing clinician: 5/13/2022            Umu Card MA  05/13/22, 13:02 EDT

## 2022-05-31 ENCOUNTER — OFFICE VISIT (OUTPATIENT)
Dept: FAMILY MEDICINE CLINIC | Facility: CLINIC | Age: 22
End: 2022-05-31

## 2022-05-31 ENCOUNTER — HOSPITAL ENCOUNTER (OUTPATIENT)
Dept: GENERAL RADIOLOGY | Facility: HOSPITAL | Age: 22
Discharge: HOME | End: 2022-05-31
Admitting: NURSE PRACTITIONER

## 2022-05-31 VITALS
HEIGHT: 63 IN | SYSTOLIC BLOOD PRESSURE: 108 MMHG | TEMPERATURE: 97.3 F | BODY MASS INDEX: 23.27 KG/M2 | DIASTOLIC BLOOD PRESSURE: 64 MMHG | RESPIRATION RATE: 18 BRPM | OXYGEN SATURATION: 98 % | HEART RATE: 64 BPM | WEIGHT: 131.3 LBS

## 2022-05-31 DIAGNOSIS — M79.645 PAIN OF LEFT THUMB: ICD-10-CM

## 2022-05-31 DIAGNOSIS — S67.02XA CRUSHING INJURY OF LEFT THUMB, INITIAL ENCOUNTER: ICD-10-CM

## 2022-05-31 DIAGNOSIS — M79.645 PAIN OF LEFT THUMB: Primary | ICD-10-CM

## 2022-05-31 PROCEDURE — 99213 OFFICE O/P EST LOW 20 MIN: CPT | Performed by: NURSE PRACTITIONER

## 2022-05-31 PROCEDURE — 73140 X-RAY EXAM OF FINGER(S): CPT

## 2022-06-02 ENCOUNTER — CLINICAL SUPPORT (OUTPATIENT)
Dept: FAMILY MEDICINE CLINIC | Facility: CLINIC | Age: 22
End: 2022-06-02

## 2022-06-02 DIAGNOSIS — Z23 NEED FOR VACCINATION: Primary | ICD-10-CM

## 2022-06-02 PROCEDURE — 90471 IMMUNIZATION ADMIN: CPT | Performed by: NURSE PRACTITIONER

## 2022-06-02 PROCEDURE — 90746 HEPB VACCINE 3 DOSE ADULT IM: CPT | Performed by: NURSE PRACTITIONER

## 2022-06-09 NOTE — PROGRESS NOTES
"Chief Complaint  Hand Pain (Closed car door on it, maybe broke, swollen tip of finger knumb x 5 days, left hand)    Subjective        Yessenia Gamez presents to [facility]  Yessenia presents with recent thumb injury, concerned she broke it. She states her thumb is numb and swollen on tip of finger, tender and bruised.    Hand Pain   The incident occurred 5 to 7 days ago. The incident occurred at home. Injury mechanism: crushing injury. Pain location: left thumb. The quality of the pain is described as aching. The pain does not radiate. The pain is moderate. The pain has been intermittent since the incident. Associated symptoms include numbness. Pertinent negatives include no chest pain, muscle weakness or tingling. The symptoms are aggravated by movement and palpation. She has tried nothing for the symptoms. The treatment provided no relief.       Objective   Vital Signs:  /64 (BP Location: Right arm, Patient Position: Sitting, Cuff Size: Adult)   Pulse 64   Temp 97.3 °F (36.3 °C) (Temporal)   Resp 18   Ht 160 cm (62.99\")   Wt 59.6 kg (131 lb 4.8 oz)   SpO2 98%   BMI 23.26 kg/m²   Estimated body mass index is 23.26 kg/m² as calculated from the following:    Height as of this encounter: 160 cm (62.99\").    Weight as of this encounter: 59.6 kg (131 lb 4.8 oz).    BMI is within normal parameters. No other follow-up for BMI required.      Physical Exam  Vitals reviewed.   Constitutional:       Appearance: Normal appearance.   Musculoskeletal:      Comments: Left thumb nail with bruising, tender to palpate, edematous, no lacerations   Skin:     General: Skin is warm and dry.   Neurological:      Mental Status: She is alert and oriented to person, place, and time.   Psychiatric:         Mood and Affect: Mood normal.        Result Review :                Assessment and Plan   Diagnoses and all orders for this visit:    1. Pain of left thumb (Primary)  -     XR finger 2+ vw left; " Future    2. Crushing injury of left thumb, initial encounter  -     XR finger 2+ vw left; Future             Follow Up   No follow-ups on file.  Patient was given instructions and counseling regarding her condition or for health maintenance advice. Please see specific information pulled into the AVS if appropriate.     Will proceed with xray, recommend wrapping with sports tape, will likely lose nail due to trauma, follow up for no improvement or worsening symptoms

## 2022-10-11 ENCOUNTER — OFFICE VISIT (OUTPATIENT)
Dept: INTERNAL MEDICINE | Facility: CLINIC | Age: 22
End: 2022-10-11

## 2022-10-11 VITALS
DIASTOLIC BLOOD PRESSURE: 74 MMHG | OXYGEN SATURATION: 99 % | BODY MASS INDEX: 22.09 KG/M2 | WEIGHT: 129.4 LBS | HEIGHT: 64 IN | SYSTOLIC BLOOD PRESSURE: 116 MMHG | HEART RATE: 82 BPM | TEMPERATURE: 97.5 F | RESPIRATION RATE: 18 BRPM

## 2022-10-11 DIAGNOSIS — Z23 NEED FOR IMMUNIZATION AGAINST INFLUENZA: ICD-10-CM

## 2022-10-11 DIAGNOSIS — Z00.00 ANNUAL PHYSICAL EXAM: Primary | ICD-10-CM

## 2022-10-11 PROCEDURE — 99395 PREV VISIT EST AGE 18-39: CPT | Performed by: INTERNAL MEDICINE

## 2022-10-11 PROCEDURE — 90471 IMMUNIZATION ADMIN: CPT | Performed by: INTERNAL MEDICINE

## 2022-10-11 PROCEDURE — 90686 IIV4 VACC NO PRSV 0.5 ML IM: CPT | Performed by: INTERNAL MEDICINE

## 2022-10-11 RX ORDER — LEVONORGESTREL AND ETHINYL ESTRADIOL 0.1-0.02MG
KIT ORAL
COMMUNITY
Start: 2022-01-01 | End: 2022-12-08 | Stop reason: SDUPTHER

## 2022-10-11 NOTE — PROGRESS NOTES
New Patient Office Visit      Date: 10/11/2022   Patient Name: Yessenia Gamez  : 2000   MRN: [id number]     Chief Complaint:    Chief Complaint   Patient presents with   • Establish Care       History of Present Illness: Yessenia Gamez is a 22 y.o. female who is here today to establish care/physical exam.    1. Preventative health - family h/o breast PGM ( age 78 breast cancer) and Paunt; no fhx of colon cancer; last pap  normal, scheduled to see gyn  (noted to have a friable cervix); denies tob/drug use; 1-2 etoh on weekends; no early heart disease in famil; UTD on immunizations except needs 4th covid vaccine and influenza vaccine; sexually active with male partner, uses OCPs for contraception; no children; rides bike daily; M1 medical student   2. Eczema - knees/elbows; uses steroid cream sparingly for intermittent flares      Subjective      Review of Systems:   Review of Systems   Constitutional: Negative for activity change, appetite change, chills, fever, unexpected weight gain and unexpected weight loss.   HENT: Positive for congestion and sneezing. Negative for hearing loss.    Respiratory: Negative for shortness of breath.    Cardiovascular: Negative for chest pain.   Gastrointestinal: Positive for constipation.   Genitourinary: Negative for dysuria, menstrual problem and urinary incontinence.        H/o UTIs    Musculoskeletal: Negative for arthralgias and myalgias.   Allergic/Immunologic: Positive for food allergies.   Neurological: Negative for syncope and weakness.   Hematological: Does not bruise/bleed easily.   Psychiatric/Behavioral: Negative for behavioral problems and depressed mood. The patient is not nervous/anxious.        Past Medical History:   Past Medical History:   Diagnosis Date   • Allergic    • Eczema    • Healthy adult on routine physical examination    • Urinary tract infection        Past Surgical History:   Past Surgical History:   Procedure Laterality Date    • WISDOM TOOTH EXTRACTION  2018       Family History:   Family History   Problem Relation Age of Onset   • Thyroid disease Mother         hypo   • Hypothyroidism Mother    • No Known Problems Father    • Nephrolithiasis Brother    • Breast cancer Paternal Aunt 40   • Cancer Paternal Aunt    • Lung cancer Maternal Grandmother    • Cancer Paternal Grandmother         Breast   • Breast cancer Paternal Grandmother 65   • Ovarian cancer Neg Hx    • Uterine cancer Neg Hx    • Endometrial cancer Neg Hx    • Colon cancer Neg Hx        Social History:   Social History     Socioeconomic History   • Marital status: Single     Spouse name: N/A   • Number of children: 0   • Years of education: College   Tobacco Use   • Smoking status: Never   • Smokeless tobacco: Never   Vaping Use   • Vaping Use: Never used   Substance and Sexual Activity   • Alcohol use: Yes     Alcohol/week: 2.0 standard drinks     Types: 2 Drinks containing 0.5 oz of alcohol per week     Comment: very rarely   • Drug use: No   • Sexual activity: Yes     Partners: Male     Birth control/protection: Condom, OCP       Medications:     Current Outpatient Medications:   •  azelastine (ASTEPRO) 0.15 % solution nasal spray, , Disp: , Rfl:   •  cetirizine (zyrTEC) 10 MG tablet, Take 10 mg by mouth Daily., Disp: , Rfl:   •  fluticasone (FLONASE) 50 MCG/ACT nasal spray, 2 sprays into the nostril(s) as directed by provider every night at bedtime., Disp: 16 g, Rfl: 3  •  levonorgestrel-ethinyl estradiol (AVIANE,ALESSE,LESSINA) 0.1-20 MG-MCG per tablet, , Disp: , Rfl:     Allergies:   Allergies   Allergen Reactions   • Chicken-Peas-Carrots [Ensure] Hives     (PEAS)   • Nitrofurantoin Hives       Objective     Physical Exam:  Vital Signs:   Vitals:    10/11/22 1319   BP: 116/74   BP Location: Right arm   Patient Position: Sitting   Cuff Size: Adult   Pulse: 82   Resp: 18   Temp: 97.5 °F (36.4 °C)   TempSrc: Infrared   SpO2: 99%   Weight: 58.7 kg (129 lb 6.4 oz)  "  Height: 162.1 cm (63.8\")   PainSc: 0-No pain     Body mass index is 22.35 kg/m².  BMI is within normal parameters. No other follow-up for BMI required.       Physical Exam  Vitals and nursing note reviewed.   Constitutional:       General: She is not in acute distress.     Appearance: Normal appearance. She is well-developed and well-groomed. She is not ill-appearing or toxic-appearing.   HENT:      Head: Normocephalic and atraumatic.      Right Ear: Tympanic membrane, ear canal and external ear normal. There is no impacted cerumen.      Left Ear: Tympanic membrane, ear canal and external ear normal. There is no impacted cerumen.      Nose: Nose normal. No congestion or rhinorrhea.      Mouth/Throat:      Mouth: Mucous membranes are moist.      Pharynx: Oropharynx is clear. No oropharyngeal exudate or posterior oropharyngeal erythema.   Eyes:      Conjunctiva/sclera: Conjunctivae normal.      Pupils: Pupils are equal, round, and reactive to light.   Cardiovascular:      Rate and Rhythm: Normal rate and regular rhythm.      Heart sounds: Normal heart sounds. No murmur heard.    No gallop.   Pulmonary:      Effort: Pulmonary effort is normal. No respiratory distress.      Breath sounds: Normal breath sounds. No wheezing, rhonchi or rales.   Abdominal:      General: Bowel sounds are normal. There is no distension.      Palpations: Abdomen is soft.      Tenderness: There is no abdominal tenderness. There is no guarding.   Musculoskeletal:      Cervical back: Neck supple.   Lymphadenopathy:      Cervical: No cervical adenopathy.   Skin:     General: Skin is warm and dry.   Neurological:      General: No focal deficit present.      Mental Status: She is alert and oriented to person, place, and time. Mental status is at baseline.      Motor: No weakness.      Gait: Gait normal.   Psychiatric:         Attention and Perception: Attention normal.         Mood and Affect: Mood normal.         Behavior: Behavior normal. " Behavior is cooperative.         Thought Content: Thought content normal.         Judgment: Judgment normal.         Procedures    Results:     Labs:   TSH   Date Value Ref Range Status   12/19/2018 2.129 0.350 - 5.350 mIU/mL Final        Results for orders placed or performed in visit on 04/15/22   Varicella Zoster Antibodies, IgG / IgM    Specimen: Blood   Result Value Ref Range    Varicella IgG 1,967 Immune >165 index    Varicella IgM <0.91 0.00 - 0.90 index   Measles / Mumps / Rubella Immunity    Specimen: Blood   Result Value Ref Range    Rubella Antibodies, IgG 9.55 Immune >0.99 index    Rubeola IgG >300.0 Immune >16.4 AU/mL    Mumps IgG 228.0 Immune >10.9 AU/mL   Hepatitis C Antibody    Specimen: Blood   Result Value Ref Range    Hep C Virus Ab <0.1 0.0 - 0.9 s/co ratio   QuantiFERON TB Gold    Specimen: Blood   Result Value Ref Range    QuantiFERON Incubation Incubation performed.     QuantiFERON Criteria Comment     QUANTIFERON TB1 AG VALUE 0.05 IU/mL    QUANTIFERON TB2 AG VALUE 0.03 IU/mL    QuantiFERON Nil Value 0.02 IU/mL    QuantiFERON Mitogen Value >10.00 IU/mL    QUANTIFERON-TB GOLD PLUS Negative Negative   Hepatitis B Surface Antibody   Result Value Ref Range    Hep B S Ab Non Reactive         Imaging:   No Images in the past 120 days found..     Assessment / Plan      Assessment/Plan:   Diagnoses and all orders for this visit:    1. Annual physical exam (Primary)  -     Basic Metabolic Panel; Future  -     Lipid Panel; Future  -     Physical exam performed today - normal; last pap smear 12/21 normal, scheduled to see gyn 12/22; start mammography age 40; start colon cancer screening age 45; UTD on immunizations except influenza and covid #4; received influenza today; pt plans to RTC in a few weeks for #4 covid; OCPs for contraceptive management; denies tob/drug use; occasional etoh use; exercises daily and maintains a healthy diet; has yearly eye exam and see dentist routinely; check screening labs;  hepatitis C screening negative    2. Need for immunization against influenza  -     FluLaval/Fluzone >6 mos (5301-2437)         Follow Up:   Return in about 1 year (around 10/11/2023).    I have spent a total of 40 min on reviewing test results/preparing to see patient, counseling patient, performing medically appropriate exam and documenting clinical information in the electronic or other health record    Jasmyne Snider MD. [facility]

## 2022-10-14 ENCOUNTER — PATIENT ROUNDING (BHMG ONLY) (OUTPATIENT)
Dept: INTERNAL MEDICINE | Facility: CLINIC | Age: 22
End: 2022-10-14

## 2022-10-14 NOTE — PROGRESS NOTES
A EVRGR message has been sent to the patient for patient rounding with [facility].

## 2022-12-08 ENCOUNTER — OFFICE VISIT (OUTPATIENT)
Dept: OBSTETRICS AND GYNECOLOGY | Facility: CLINIC | Age: 22
End: 2022-12-08

## 2022-12-08 VITALS
SYSTOLIC BLOOD PRESSURE: 116 MMHG | WEIGHT: 129 LBS | RESPIRATION RATE: 16 BRPM | DIASTOLIC BLOOD PRESSURE: 70 MMHG | BODY MASS INDEX: 22.28 KG/M2

## 2022-12-08 DIAGNOSIS — Z30.41 ENCOUNTER FOR SURVEILLANCE OF CONTRACEPTIVE PILLS: ICD-10-CM

## 2022-12-08 DIAGNOSIS — Z01.411 ENCOUNTER FOR GYNECOLOGICAL EXAMINATION WITH ABNORMAL FINDING: Primary | ICD-10-CM

## 2022-12-08 DIAGNOSIS — N88.8 FRIABLE CERVIX: ICD-10-CM

## 2022-12-08 PROCEDURE — 99395 PREV VISIT EST AGE 18-39: CPT | Performed by: NURSE PRACTITIONER

## 2022-12-08 RX ORDER — LEVONORGESTREL AND ETHINYL ESTRADIOL 0.1-0.02MG
1 KIT ORAL DAILY
Qty: 84 TABLET | Refills: 3 | Status: SHIPPED | OUTPATIENT
Start: 2022-12-08

## 2022-12-08 NOTE — PROGRESS NOTES
Annual Visit     Patient Name: Yessenia Gamez  : 2000   MRN: [id number]   Care Team: Patient Care Team:  Jasmyne Snider MD as PCP - General (Internal Medicine)  Jennifer Vázquez APRN as Nurse Practitioner (Nurse Practitioner)    Chief Complaint:    Chief Complaint   Patient presents with   • Annual Exam       HPI: Yessenia Gamez is a 22 y.o. year old  presenting to be seen for her gynecologic exam.   Pap smear 2021 WNL and STI screening negative   Sexually active in a monogamous relationship x 8 yrs now - declines STI screening      Changed from Nortrel BCPs to Balcoltra last yr   Vaginal dryness with previous pill has resolved now   Changed to generic RISA d/t insurance coverage, but still doing well with method   Light withdrawal bldg x 4-5 days     Friable cervix noted on exam last yr   States she had bldg x 4 days following pap smear  Cervix treated with silver nitrate   States postcoital bldg decreased in frequency but did not resolve   Denies s/sx of vaginal infection and no pelvic pain or dyspareunia     Completed undergraduate program at [employer] in May 2022   Attending medical school now at [employer]      I have reviewed the patients family history, social history, past medical history, past surgical history and have updated it as appropriate.    /70   Resp 16   Wt 58.5 kg (129 lb)   LMP 2022   BMI 22.28 kg/m²     BMI reviewed: Body mass index is 22.28 kg/m².      Objective     Physical Exam    Neuro: alert and oriented to person, place and time   General:  alert; cooperative; well developed; well nourished   Skin:  No suspicious lesions seen   Thyroid: normal to inspection and palpation   Lungs:  breathing is unlabored  clear to auscultation bilaterally   Heart:  regular rate and rhythm, S1, S2 normal, no murmur, click, rub or gallop  normal apical impulse   Breasts:  Examined in supine position  Symmetric without masses or skin dimpling  Nipples  "Pre-Endoscopy History and Physical     Lucie Whitehead MRN# [id number]   YOB: 1952 Age: 66 year old     Date of Procedure: 6/4/2019  Primary care provider: Nallely Moore  Type of Endoscopy: Colonoscopy with possible biopsy, possible polypectomy  Reason for Procedure: screen  Type of Anesthesia Anticipated: Conscious Sedation    HPI:    Lucie is a 66 year old female who will be undergoing the above procedure.      A history and physical has been performed. The patient's medications and allergies have been reviewed. The risks and benefits of the procedure and the sedation options and risks were discussed with the patient.  All questions were answered and informed consent was obtained.      She denies a personal or family history of anesthesia complications or bleeding disorders.     Patient Active Problem List   Diagnosis     Hirsutism     Leiomyoma of uterus     Ovarian cyst     Hyperlipidemia LDL goal <160     Lumbar spinal stenosis     PEÑA (obstructive sleep apnea)     Impaired fasting glucose     Macular degeneration     Tubular adenoma of colon     Major depressive disorder, recurrent episode (H)     Non morbid obesity due to excess calories     Osteopenia     Advance care planning     Lumbar radiculopathy     Obesity (BMI 35.0-39.9) with comorbidity (H)     Pain of both shoulder joints     Hip pain, right     Hip pain, left     Trochanteric bursitis of both hips     Impingement syndrome of both shoulders     Chronic pain of both knees        Past Medical History:   Diagnosis Date     Complication of anesthesia     extremely dry eyes     Diabetes (H)      Gastro-oesophageal reflux disease      Hirsutism     Abstracted 06/14/02     History of blood transfusion      Leiomyoma of uterus, unspecified     Abstracted 06/14/02     Obesity 4/14/2010    Estimated Body mass index is 28.97 kg/(m^2) as calculated from the following:   Height as of 7/10/13: 5' 7\"(1.702 m).   Weight as of 7/10/13: 185 " lb(83.915 kg).       Other and unspecified hyperlipidemia     Abstracted 02     Other chronic pain     low back and legs and feet     Sleep apnea     CPAP        Past Surgical History:   Procedure Laterality Date     ARTHROSCOPY SHOULDER DISTAL CLAVICLE REPAIR Right 2014    Procedure: ARTHROSCOPY SHOULDER DISTAL CLAVICLE RESECTION;  Surgeon: John Paul Rodriguez MD;  Location: [facility] OR     C NONSPECIFIC PROCEDURE      Bicep tendon repair    Abstracted 02     C/SECTION, LOW TRANSVERSE  x 3    , Low Transverse x 4     DECOMPRESSION, FUSION CERVICAL ANTERIOR TWO LEVELS, COMBINED  2012    Procedure:COMBINED DECOMPRESSION, FUSION CERVICAL ANTERIOR TWO LEVELS; DECOMPRESSION, FUSION CERVICAL ANTERIOR C4-6; Surgeon:DAVIDE ALCALA; Location:[facility] OR     DECOMPRESSION, FUSION LUMBAR POSTERIOR ONE LEVEL, COMBINED  3/7/2013    Procedure: COMBINED DECOMPRESSION, FUSION LUMBAR POSTERIOR ONE LEVEL;  Posterior Fusion L2-3, Hardware Removal L3-5;  Surgeon: Davide Alcala MD;  Location: [facility] OR     DECOMPRESSION, FUSION LUMBAR POSTERIOR TWO LEVELS, COMBINED N/A 7/3/2018    Procedure: COMBINED DECOMPRESSION, FUSION LUMBAR POSTERIOR TWO LEVELS;  Bilateral decompression L1-L2 with left total facetectomy   Transforaminal lumbar interbody fusion L1-L2 from left-sided approach   Insertion of intervertebral biomechanical device L1-L2  Posterolateral fusion L1-L2; Removal of instrumentation L2-3  RE-Instrumentation L1-L3;  Surgeon: Davide Alcala MD;  Location: [facility] OR     EXPLORE SPINE, REMOVE HARDWARE, COMBINED N/A 7/3/2018    Procedure: COMBINED EXPLORE SPINE, REMOVE HARDWARE;;  Surgeon: Davide Alacla MD;  Location: [facility] OR     FUSION LUMBAR ANTERIOR ONE LEVEL  2013    Procedure: FUSION LUMBAR ANTERIOR ONE LEVEL;  Anterior Fusion L2-3;  Surgeon: Davide Alcala MD;  Location: [facility] OR     LAMINECTOMY, FUSION LUMBAR TWO LEVELS, COMBINED  3/11    AP fusion L3-5       Social History  normal without inversion, lesions or discharge  There are no palpable axillary nodes  Fibrocystic changes are present both breasts without a discrete mass   Abdomen: soft, non-tender; no masses  no umbilical or inguinal hernias are present  no hepato-splenomegaly   Pelvis: Clinical staff was present for exam  External genitalia:  normal appearance of the external genitalia including Bartholin's and [place]'s glands.  :  urethral meatus normal;  Vaginal:  normal pink mucosa without prolapse or lesions.  Cervix:  normal appearance. friable;  Uterus:  normal size, shape and consistency.  Adnexa:  normal bimanual exam of the adnexa.  Rectal:  digital rectal exam not performed; anus visually normal appearing.     Cervix treated with silver nitrate     Assessment / Plan      Assessment  Problems Addressed This Visit    ICD-10-CM ICD-9-CM   1. Encounter for gynecological examination with abnormal finding  Z01.411 V72.31   2. Encounter for surveillance of contraceptive pills  Z30.41 V25.41   3. Friable cervix  N88.8 622.8       Plan    Pap smear not indicated today   Discussed monthly SBes and fibrocystic breast changes   No C/is to cont with COCs - refills to pharmacy   Cervix friable on exam today and postcoital bldg still present   If postcoital bldg does not completely resolve, she will let me know   AV 1 yr         19 to 39: Counseling/Anticipatory Guidance Discussed: family planning/contraception and breast cancer and self breast exams    Follow Up  Return in about 1 year (around 12/8/2023) for Annual physical.  Patient was given instructions and counseling regarding her condition or for health maintenance advice. Please see specific information pulled into the AVS if appropriate.     Jennifer Vázquez, APRN  December 8, 2022  14:07 EST         Tobacco Use     Smoking status: Former Smoker     Last attempt to quit: 1975     Years since quittin.4     Smokeless tobacco: Never Used   Substance Use Topics     Alcohol use: Yes     Alcohol/week: 0.0 oz     Comment: 2-3 glass wine /week       Family History   Problem Relation Age of Onset     Prostate Cancer Father      Cancer - colorectal Mother         born  HTN and DMII     Diabetes Mother         type II     Coronary Artery Disease Mother         CHF     Diabetes Maternal Grandmother         type II     Hypertension Maternal Grandmother      Cancer - colorectal Maternal Grandmother      Diabetes Maternal Uncle         type II     Hypertension Maternal Uncle      Respiratory Paternal Grandmother         asthma     Diabetes Son         dx age 21type I       Prior to Admission medications    Medication Sig Start Date End Date Taking? Authorizing Provider   Acetaminophen (TYLENOL PO) Take 1,300 mg by mouth 2 times daily     Reported, Patient   buPROPion (WELLBUTRIN XL) 150 MG 24 hr tablet Take 1 tablet (150 mg) by mouth every morning 3/26/19   Nallely Moore MD   CALCIUM PO Take 950 mg by mouth daily     Reported, Patient   Cholecalciferol (VITAMIN D3 PO) Take 1,000 Units by mouth daily    Reported, Patient   DULoxetine (CYMBALTA) 30 MG capsule Take 1 capsule (30 mg) by mouth 2 times daily Start with once daily x1wk then increase to bid 19   Nallely Moore MD   hydrOXYzine (ATARAX) 25 MG tablet Take 1-2 tablets (25-50 mg) by mouth every 4 hours as needed for anxiety And take 2-4 tabs about 30-60 mins before bed 19   Nallely Moore MD   loratadine (CLARITIN) 10 MG tablet Take 10 mg by mouth daily    Reported, Patient   Multiple Vitamins-Minerals (OCUVITE ADULT 50+ PO) Take 1 tablet by mouth daily     Reported, Patient   NONFORMULARY Sustained night time lubricant eye ointment night before bed    Reported, Patient   Omega-3 Fatty Acids (OMEGA 3 PO) Take 1 tablet by mouth daily      "Reported, Patient   ranitidine (ZANTAC 75) 75 MG tablet Take 75 mg by mouth 2 times daily as needed.    Unknown, Entered By History   RESTASIS 0.05 % ophthalmic emulsion INSTILL 1 DROP INTO AFFECTED EYE(S) BY OPHTHALMIC ROUTE EVERY 12 HOURS 1/25/18   Reported, Patient   simvastatin (ZOCOR) 40 MG tablet Take 1 tablet (40 mg) by mouth At Bedtime 4/23/19   Nallely Moore MD   traMADol (ULTRAM) 50 MG tablet TAKE 1 - 2 TABLET BY ORAL ROUTE EVERY 4 - 6 HOURS AS NEEDED 3/20/19   Reported, Patient   ZYRTEC 10 MG OR TABS 1 TABLET EACH MORNING    Reported, Patient       Allergies   Allergen Reactions     Animal Dander      Cats      Dogs      Seasonal Allergies      Trees and mold        REVIEW OF SYSTEMS:   5 point ROS negative except as noted above in HPI, including Gen., Resp., CV, GI &  system review.    PHYSICAL EXAM:   LMP 11/12/2007 (LMP Unknown)  Estimated body mass index is 39.66 kg/m  as calculated from the following:    Height as of 4/23/19: 1.6 m (5' 3\").    Weight as of 4/23/19: 101.6 kg (223 lb 14.4 oz).   GENERAL APPEARANCE: alert, and oriented  MENTAL STATUS: alert  AIRWAY EXAM: Mallampatti Class I (visualization of the soft palate, fauces, uvula, anterior and posterior pillars)  RESP: lungs clear to auscultation - no rales, rhonchi or wheezes  CV: regular rates and rhythm  DIAGNOSTICS:    Not indicated    IMPRESSION   ASA Class 2 - Mild systemic disease    PLAN:   Plan for Colonoscopy. We discussed the risks, benefits and alternatives and the patient wished to proceed.    The above has been forwarded to the consulting provider.      Signed Electronically by: Hernán Gonzales  June 4, 2019          "

## 2023-04-27 ENCOUNTER — OFFICE VISIT (OUTPATIENT)
Dept: INTERNAL MEDICINE | Facility: CLINIC | Age: 23
End: 2023-04-27
Payer: COMMERCIAL

## 2023-04-27 VITALS
HEART RATE: 93 BPM | OXYGEN SATURATION: 99 % | TEMPERATURE: 97.5 F | HEIGHT: 64 IN | WEIGHT: 129 LBS | BODY MASS INDEX: 22.02 KG/M2

## 2023-04-27 DIAGNOSIS — R05.9 COUGH, UNSPECIFIED TYPE: ICD-10-CM

## 2023-04-27 DIAGNOSIS — J02.9 SORE THROAT: ICD-10-CM

## 2023-04-27 DIAGNOSIS — H93.8X3 EAR CONGESTION, BILATERAL: Primary | ICD-10-CM

## 2023-04-27 LAB
EXPIRATION DATE: NORMAL
EXPIRATION DATE: NORMAL
FLUAV AG UPPER RESP QL IA.RAPID: NOT DETECTED
FLUBV AG UPPER RESP QL IA.RAPID: NOT DETECTED
INTERNAL CONTROL: NORMAL
INTERNAL CONTROL: NORMAL
Lab: NORMAL
Lab: NORMAL
S PYO AG THROAT QL: NEGATIVE
SARS-COV-2 AG UPPER RESP QL IA.RAPID: NOT DETECTED

## 2023-04-27 PROCEDURE — 87428 SARSCOV & INF VIR A&B AG IA: CPT | Performed by: STUDENT IN AN ORGANIZED HEALTH CARE EDUCATION/TRAINING PROGRAM

## 2023-04-27 PROCEDURE — 99213 OFFICE O/P EST LOW 20 MIN: CPT | Performed by: STUDENT IN AN ORGANIZED HEALTH CARE EDUCATION/TRAINING PROGRAM

## 2023-04-27 PROCEDURE — 87880 STREP A ASSAY W/OPTIC: CPT | Performed by: STUDENT IN AN ORGANIZED HEALTH CARE EDUCATION/TRAINING PROGRAM

## 2023-04-27 RX ORDER — METHYLPREDNISOLONE 4 MG/1
TABLET ORAL
Qty: 21 TABLET | Refills: 0 | Status: SHIPPED | OUTPATIENT
Start: 2023-04-27

## 2023-04-27 NOTE — PROGRESS NOTES
Office Note     Name: Yessenia Gamez    : 2000     MRN: [id number]     Chief Complaint  Cough, Earache (RT EAR PAIN AND DISCOMFORT /), and Sore Throat    Subjective     History of Present Illness:  Yessenia Gamez is a 22 y.o. female who presents today for     Notice past few days increase cough with post nasal drip and bilateral ear fullness. No fever, no SOB, no rhinorrhea, no tinnitus, nowheezing    Review of Systems:   Review of Systems   All other systems reviewed and are negative.      Past Medical History:   Past Medical History:   Diagnosis Date   • Allergic    • Eczema    • Healthy adult on routine physical examination    • Urinary tract infection        Past Surgical History:   Past Surgical History:   Procedure Laterality Date   • WISDOM TOOTH EXTRACTION         Family History:   Family History   Problem Relation Age of Onset   • Thyroid disease Mother         hypo   • Hypothyroidism Mother    • No Known Problems Father    • Nephrolithiasis Brother    • Breast cancer Paternal Aunt 40   • Cancer Paternal Aunt    • Lung cancer Maternal Grandmother    • Cancer Paternal Grandmother         Breast   • Breast cancer Paternal Grandmother 65   • Ovarian cancer Neg Hx    • Uterine cancer Neg Hx    • Endometrial cancer Neg Hx    • Colon cancer Neg Hx        Social History:   Social History     Socioeconomic History   • Marital status: Single     Spouse name: N/A   • Number of children: 0   • Years of education: College   Tobacco Use   • Smoking status: Never   • Smokeless tobacco: Never   Vaping Use   • Vaping Use: Never used   Substance and Sexual Activity   • Alcohol use: Yes     Alcohol/week: 2.0 standard drinks     Types: 2 Drinks containing 0.5 oz of alcohol per week     Comment: very rarely   • Drug use: No   • Sexual activity: Yes     Partners: Male     Birth control/protection: Condom, OCP       Immunizations:   Immunization History   Administered Date(s) Administered   • DTaP 2000,  "2000, 01/26/2001, 11/29/2002, 07/12/2004   • FluLaval/Fluzone >6mos 10/11/2022   • Flucelvax Quad Vial =>4yrs 11/14/2019   • HPV Quadrivalent 07/09/2011   • Hep B, Unspecified 2000, 2000, 07/31/2001   • Hepatitis A 06/03/2013, 08/05/2016   • Hepatitis B 05/05/2022, 06/02/2022   • Hepatitis B Adult/Adolescent IM 2000, 2000, 07/31/2001   • HiB 2000, 2000, 01/26/2001, 07/31/2001   • Hpv9 04/15/2022   • IPV 2000, 2000, 01/26/2001, 07/12/2004   • Influenza Injectable Mdck Pf Quad 11/08/2021   • Influenza LAIV (Nasal) 12/01/2007, 08/06/2009, 09/25/2010   • Influenza, Unspecified 11/14/2019   • MMR 07/31/2001, 07/12/2004   • Meningococcal B,(Bexsero) 08/05/2016   • Meningococcal Polysaccharide 07/09/2011, 08/05/2016   • Pneumococcal Conjugate 13-Valent (PCV13) 2000, 2000, 01/26/2001, 07/31/2001   • Tdap 07/09/2011, 04/15/2022   • Varicella 07/31/2001, 07/12/2004        Medications:     Current Outpatient Medications:   •  azelastine (ASTEPRO) 0.15 % solution nasal spray, , Disp: , Rfl:   •  cetirizine (zyrTEC) 10 MG tablet, Take 1 tablet by mouth Daily., Disp: , Rfl:   •  fluticasone (FLONASE) 50 MCG/ACT nasal spray, 2 sprays into the nostril(s) as directed by provider every night at bedtime., Disp: 16 g, Rfl: 3  •  levonorgestrel-ethinyl estradiol (AVIANE,ALESSE,LESSINA) 0.1-20 MG-MCG per tablet, Take 1 tablet by mouth Daily., Disp: 84 tablet, Rfl: 3  •  methylPREDNISolone (MEDROL) 4 MG dose pack, Take as directed on package instructions., Disp: 21 tablet, Rfl: 0    Allergies:   Allergies   Allergen Reactions   • Chicken-Peas-Carrots [Ensure] Hives     (PEAS)   • Nitrofurantoin Hives       Objective     Vital Signs  Pulse 93   Temp 97.5 °F (36.4 °C) (Temporal)   Ht 162.1 cm (63.82\")   Wt 58.5 kg (129 lb)   SpO2 99%   BMI 22.27 kg/m²   Estimated body mass index is 22.27 kg/m² as calculated from the following:    Height as of this encounter: 162.1 cm " "(63.82\").    Weight as of this encounter: 58.5 kg (129 lb).    BMI is within normal parameters. No other follow-up for BMI required.      Physical Exam  Constitutional:       General: She is not in acute distress.     Appearance: She is normal weight. She is not ill-appearing.   HENT:      Right Ear: A middle ear effusion is present. There is no impacted cerumen.      Left Ear: A middle ear effusion is present. There is no impacted cerumen.      Nose: Congestion present.      Mouth/Throat:      Mouth: Mucous membranes are moist.      Pharynx: No oropharyngeal exudate.   Neurological:      Mental Status: She is alert.          Result Review :            POCT SARS-CoV-2 Antigen JACKELYN + Flu [[id number]] (Order [id number])  Order  Date: 4/27/2023 Department: [facility] PRIMARY CARE Ordering/Authorizing: Luis Lopez MD     Reprint Order Requisition    POCT SARS-CoV-2 Antigen JACKELYN + Flu (Order #[id number]) on 4/27/23        POCT SARS-CoV-2 Antigen JACKELYN + Flu  Order: [id number]   Status: Final result      Visible to patient: No (scheduled for 4/27/2023  4:01 PM)      Next appt: 10/16/2023 at 02:15 PM in Internal Medicine (Luis Lopez MD)      Dx: Ear congestion, bilateral; Sore throa...     Specimen Information: Swab    0 Result Notes        Component  Ref Range & Units 14:59 14:58 2 yr ago   SARS Antigen  Not Detected, Presumptive Negative Not Detected      Influenza A Antigen JACKELYN  Not Detected Not Detected      Influenza B Antigen JACKELYN  Not Detected Not Detected      Internal Control  Passed Passed  Passed     Lot Number [id number]  [id number]     Expiration Date 3/23/24  10/31/24  04/30/2021               Assessment and Plan     1. Ear congestion, bilateral  2. Sore throat  3. Cough, unspecified type  Supportive care, recommend NSAID/Tylenol PRN for fever/pain  Adequate hydration  Season allergie medicines PRN    - POCT rapid strep A  - POCT SARS-CoV-2 Antigen JACKELYN + Flu  - methylPREDNISolone (MEDROL) 4 MG dose " pack; Take as directed on package instructions.  Dispense: 21 tablet; Refill: 0       Follow Up  No follow-ups on file.    MD RONNELL [facility] [address]  [facility] PRIMARY [facility]ARE  [address]  [address] [address] [phone]

## 2023-08-09 ENCOUNTER — OFFICE VISIT (OUTPATIENT)
Dept: INTERNAL MEDICINE | Facility: CLINIC | Age: 23
End: 2023-08-09
Payer: COMMERCIAL

## 2023-08-09 VITALS
SYSTOLIC BLOOD PRESSURE: 106 MMHG | TEMPERATURE: 97.3 F | DIASTOLIC BLOOD PRESSURE: 64 MMHG | OXYGEN SATURATION: 99 % | WEIGHT: 132.2 LBS | HEART RATE: 83 BPM | BODY MASS INDEX: 22.57 KG/M2 | HEIGHT: 64 IN

## 2023-08-09 DIAGNOSIS — L98.9 SKIN LESION: Primary | ICD-10-CM

## 2023-08-09 PROCEDURE — 99213 OFFICE O/P EST LOW 20 MIN: CPT | Performed by: STUDENT IN AN ORGANIZED HEALTH CARE EDUCATION/TRAINING PROGRAM

## 2023-08-09 RX ORDER — NORETHINDRONE AND ETHINYL ESTRADIOL 1 MG-35MCG
1 KIT ORAL DAILY
COMMUNITY

## 2023-08-09 NOTE — PROGRESS NOTES
Office Note     Name: Yessenia Gamez    : 2000     MRN: [id number]     Chief Complaint  Rash (Arm and knee )    Subjective     History of Present Illness:  Yessenia Gamez is a 23 y.o. female who presents today for     Flesh colored vesicles on her left knee, that has recently appeared.  Non painful, no bleeding, no blisterin, does not itch  Has dermatology appointment     Review of Systems:   Review of Systems   All other systems reviewed and are negative.    Past Medical History:   Past Medical History:   Diagnosis Date    Allergic     Eczema     Healthy adult on routine physical examination     Urinary tract infection        Past Surgical History:   Past Surgical History:   Procedure Laterality Date    WISDOM TOOTH EXTRACTION  2018       Family History:   Family History   Problem Relation Age of Onset    Thyroid disease Mother         hypo    Hypothyroidism Mother     No Known Problems Father     Nephrolithiasis Brother     Breast cancer Paternal Aunt 40    Cancer Paternal Aunt     Lung cancer Maternal Grandmother     Cancer Paternal Grandmother         Breast    Breast cancer Paternal Grandmother 65    Ovarian cancer Neg Hx     Uterine cancer Neg Hx     Endometrial cancer Neg Hx     Colon cancer Neg Hx        Social History:   Social History     Socioeconomic History    Marital status: Single     Spouse name: N/A    Number of children: 0    Years of education: College   Tobacco Use    Smoking status: Never    Smokeless tobacco: Never   Vaping Use    Vaping Use: Never used   Substance and Sexual Activity    Alcohol use: Yes     Alcohol/week: 2.0 standard drinks     Types: 2 Drinks containing 0.5 oz of alcohol per week     Comment: very rarely    Drug use: No    Sexual activity: Yes     Partners: Male     Birth control/protection: Condom, OCP       Immunizations:   Immunization History   Administered Date(s) Administered    COVID-19 (PFIZER) Purple Cap Monovalent 2021, 2021, 2022    DTaP  "2000, 2000, 01/26/2001, 11/29/2002, 07/12/2004    Flucelvax Quad Vial =>4yrs 11/14/2019    Fluzone >6mos 10/11/2022    HPV Quadrivalent 07/09/2011    Hep B, Unspecified 2000, 2000, 07/31/2001    Hepatitis A 06/03/2013, 08/05/2016    Hepatitis B 05/05/2022, 06/02/2022    Hepatitis B Adult/Adolescent IM 2000, 2000, 07/31/2001    HiB 2000, 2000, 01/26/2001, 07/31/2001    Hpv9 04/15/2022    IPV 2000, 2000, 01/26/2001, 07/12/2004    Influenza Injectable Mdck Pf Quad 11/08/2021    Influenza LAIV (Nasal) 12/01/2007, 08/06/2009, 09/25/2010    Influenza, Unspecified 11/14/2019    MMR 07/31/2001, 07/12/2004    Meningococcal B,(Bexsero) 08/05/2016    Meningococcal Polysaccharide 07/09/2011, 08/05/2016    Pneumococcal Conjugate 13-Valent (PCV13) 2000, 2000, 01/26/2001, 07/31/2001    Tdap 07/09/2011, 04/15/2022    Varicella 07/31/2001, 07/12/2004        Medications:     Current Outpatient Medications:     azelastine (ASTEPRO) 0.15 % solution nasal spray, , Disp: , Rfl:     cetirizine (zyrTEC) 10 MG tablet, Take 1 tablet by mouth Daily., Disp: , Rfl:     fluticasone (FLONASE) 50 MCG/ACT nasal spray, 2 sprays into the nostril(s) as directed by provider every night at bedtime., Disp: 16 g, Rfl: 3    norethindrone-ethinyl estradiol (Nortrel 1/35, 28,) 1-35 MG-MCG per tablet, Take 1 tablet by mouth Daily., Disp: , Rfl:     salicylic acid 17 % gel, Apply 1 application  topically to the appropriate area as directed Daily., Disp: 14 g, Rfl: 1    triamcinolone (KENALOG) 0.1 % ointment, Apply 1 application  topically to the appropriate area as directed 2 (Two) Times a Day., Disp: 80 g, Rfl: 0    Allergies:   Allergies   Allergen Reactions    Chicken-Peas-Carrots [Ensure] Hives     (PEAS)    Nitrofurantoin Hives       Objective     Vital Signs  /64   Pulse 83   Temp 97.3 øF (36.3 øC) (Temporal)   Ht 162.1 cm (63.82\")   Wt 60 kg (132 lb 3.2 oz)   SpO2 99%   " "BMI 22.82 kg/mý   Estimated body mass index is 22.82 kg/mý as calculated from the following:    Height as of this encounter: 162.1 cm (63.82\").    Weight as of this encounter: 60 kg (132 lb 3.2 oz).    BMI is within normal parameters. No other follow-up for BMI required.      Physical Exam  Constitutional:       Appearance: Normal appearance.   Skin:     General: Skin is warm.      Findings: Lesion and rash present. Rash is vesicular.          Neurological:      Mental Status: She is alert.        Result Review :                Assessment and Plan     1. Skin lesion  Appears to be Molluscum contagiosum   Follow up with derm  - salicylic acid 17 % gel; Apply 1 application  topically to the appropriate area as directed Daily.  Dispense: 14 g; Refill: 1  - triamcinolone (KENALOG) 0.1 % ointment; Apply 1 application  topically to the appropriate area as directed 2 (Two) Times a Day.  Dispense: 80 g; Refill: 0       Follow Up  No follow-ups on file.    Luis Lopez MD  [facility] [address]  [facility]  [address] [facility] [address] [id number] [phone]    "

## 2023-12-20 ENCOUNTER — OFFICE VISIT (OUTPATIENT)
Dept: OBSTETRICS AND GYNECOLOGY | Facility: CLINIC | Age: 23
End: 2023-12-20
Payer: COMMERCIAL

## 2023-12-20 ENCOUNTER — OFFICE VISIT (OUTPATIENT)
Dept: INTERNAL MEDICINE | Facility: CLINIC | Age: 23
End: 2023-12-20
Payer: COMMERCIAL

## 2023-12-20 ENCOUNTER — LAB (OUTPATIENT)
Dept: INTERNAL MEDICINE | Facility: CLINIC | Age: 23
End: 2023-12-20
Payer: COMMERCIAL

## 2023-12-20 VITALS
WEIGHT: 132.2 LBS | DIASTOLIC BLOOD PRESSURE: 76 MMHG | SYSTOLIC BLOOD PRESSURE: 120 MMHG | BODY MASS INDEX: 22.57 KG/M2 | HEIGHT: 64 IN

## 2023-12-20 VITALS
WEIGHT: 132.6 LBS | SYSTOLIC BLOOD PRESSURE: 106 MMHG | HEART RATE: 79 BPM | BODY MASS INDEX: 22.64 KG/M2 | DIASTOLIC BLOOD PRESSURE: 70 MMHG | TEMPERATURE: 97.5 F | HEIGHT: 64 IN | OXYGEN SATURATION: 100 %

## 2023-12-20 DIAGNOSIS — Z00.00 WELL ADULT EXAM: ICD-10-CM

## 2023-12-20 DIAGNOSIS — R59.0 ENLARGED LYMPH NODE IN NECK: ICD-10-CM

## 2023-12-20 DIAGNOSIS — Z30.41 ENCOUNTER FOR SURVEILLANCE OF CONTRACEPTIVE PILLS: ICD-10-CM

## 2023-12-20 DIAGNOSIS — R22.1 NECK MASS: ICD-10-CM

## 2023-12-20 DIAGNOSIS — Z00.00 WELL ADULT EXAM: Primary | ICD-10-CM

## 2023-12-20 DIAGNOSIS — Z01.419 ENCOUNTER FOR GYNECOLOGICAL EXAMINATION WITHOUT ABNORMAL FINDING: Primary | ICD-10-CM

## 2023-12-20 LAB
ALBUMIN SERPL-MCNC: 4.5 G/DL (ref 3.5–5.2)
ALBUMIN/GLOB SERPL: 1.4 G/DL
ALP SERPL-CCNC: 55 U/L (ref 39–117)
ALT SERPL W P-5'-P-CCNC: 17 U/L (ref 1–33)
ANION GAP SERPL CALCULATED.3IONS-SCNC: 11.5 MMOL/L (ref 5–15)
AST SERPL-CCNC: 18 U/L (ref 1–32)
BASOPHILS # BLD AUTO: 0.03 10*3/MM3 (ref 0–0.2)
BASOPHILS NFR BLD AUTO: 0.4 % (ref 0–1.5)
BILIRUB SERPL-MCNC: 0.4 MG/DL (ref 0–1.2)
BUN SERPL-MCNC: 14 MG/DL (ref 6–20)
BUN/CREAT SERPL: 14.6 (ref 7–25)
CALCIUM SPEC-SCNC: 9.9 MG/DL (ref 8.6–10.5)
CHLORIDE SERPL-SCNC: 101 MMOL/L (ref 98–107)
CO2 SERPL-SCNC: 24.5 MMOL/L (ref 22–29)
CREAT SERPL-MCNC: 0.96 MG/DL (ref 0.57–1)
DEPRECATED RDW RBC AUTO: 37.7 FL (ref 37–54)
EGFRCR SERPLBLD CKD-EPI 2021: 85.4 ML/MIN/1.73
EOSINOPHIL # BLD AUTO: 0.1 10*3/MM3 (ref 0–0.4)
EOSINOPHIL NFR BLD AUTO: 1.3 % (ref 0.3–6.2)
ERYTHROCYTE [DISTWIDTH] IN BLOOD BY AUTOMATED COUNT: 11.7 % (ref 12.3–15.4)
GLOBULIN UR ELPH-MCNC: 3.3 GM/DL
GLUCOSE SERPL-MCNC: 101 MG/DL (ref 65–99)
HCT VFR BLD AUTO: 46 % (ref 34–46.6)
HGB BLD-MCNC: 15.3 G/DL (ref 12–15.9)
IMM GRANULOCYTES # BLD AUTO: 0.01 10*3/MM3 (ref 0–0.05)
IMM GRANULOCYTES NFR BLD AUTO: 0.1 % (ref 0–0.5)
LYMPHOCYTES # BLD AUTO: 2.17 10*3/MM3 (ref 0.7–3.1)
LYMPHOCYTES NFR BLD AUTO: 27.7 % (ref 19.6–45.3)
MCH RBC QN AUTO: 29.4 PG (ref 26.6–33)
MCHC RBC AUTO-ENTMCNC: 33.3 G/DL (ref 31.5–35.7)
MCV RBC AUTO: 88.5 FL (ref 79–97)
MONOCYTES # BLD AUTO: 0.47 10*3/MM3 (ref 0.1–0.9)
MONOCYTES NFR BLD AUTO: 6 % (ref 5–12)
NEUTROPHILS NFR BLD AUTO: 5.04 10*3/MM3 (ref 1.7–7)
NEUTROPHILS NFR BLD AUTO: 64.5 % (ref 42.7–76)
NRBC BLD AUTO-RTO: 0 /100 WBC (ref 0–0.2)
PLATELET # BLD AUTO: 265 10*3/MM3 (ref 140–450)
PMV BLD AUTO: 11 FL (ref 6–12)
POTASSIUM SERPL-SCNC: 4.8 MMOL/L (ref 3.5–5.2)
PROT SERPL-MCNC: 7.8 G/DL (ref 6–8.5)
RBC # BLD AUTO: 5.2 10*6/MM3 (ref 3.77–5.28)
SODIUM SERPL-SCNC: 137 MMOL/L (ref 136–145)
TSH SERPL DL<=0.05 MIU/L-ACNC: 2.18 UIU/ML (ref 0.27–4.2)
WBC NRBC COR # BLD AUTO: 7.82 10*3/MM3 (ref 3.4–10.8)

## 2023-12-20 PROCEDURE — 99395 PREV VISIT EST AGE 18-39: CPT | Performed by: STUDENT IN AN ORGANIZED HEALTH CARE EDUCATION/TRAINING PROGRAM

## 2023-12-20 PROCEDURE — 84443 ASSAY THYROID STIM HORMONE: CPT | Performed by: STUDENT IN AN ORGANIZED HEALTH CARE EDUCATION/TRAINING PROGRAM

## 2023-12-20 PROCEDURE — 36415 COLL VENOUS BLD VENIPUNCTURE: CPT | Performed by: STUDENT IN AN ORGANIZED HEALTH CARE EDUCATION/TRAINING PROGRAM

## 2023-12-20 PROCEDURE — 85025 COMPLETE CBC W/AUTO DIFF WBC: CPT | Performed by: STUDENT IN AN ORGANIZED HEALTH CARE EDUCATION/TRAINING PROGRAM

## 2023-12-20 PROCEDURE — 80053 COMPREHEN METABOLIC PANEL: CPT | Performed by: STUDENT IN AN ORGANIZED HEALTH CARE EDUCATION/TRAINING PROGRAM

## 2023-12-20 RX ORDER — NORETHINDRONE AND ETHINYL ESTRADIOL 1 MG-35MCG
1 KIT ORAL DAILY
Qty: 28 TABLET | Refills: 12 | Status: SHIPPED | OUTPATIENT
Start: 2023-12-20

## 2023-12-20 NOTE — PROGRESS NOTES
Office Note     Name: Yessenia Gamez    : 2000     MRN: [id number]     Chief Complaint  Annual Exam    Subjective     History of Present Illness:  Yessenia Gamez is a 23 y.o. 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 Reports classes are doing well.    Preventative health - family h/o breast PGM ( age 78 breast cancer) and Paunt; no fhx of colon cancer; last pap  normal,   denies tob/drug use;   1-2 etoh on weekends;   no early heart disease in famil;   UTD on immunizations except needs ,   uses OCPs for contraception; no children; rides bike daily; M        PHQ-9 Depression Screening  Little interest or pleasure in doing things? 0-->not at all   Feeling down, depressed, or hopeless? 0-->not at all   Trouble falling or staying asleep, or sleeping too much?     Feeling tired or having little energy?     Poor appetite or overeating?     Feeling bad about yourself - or that you are a failure or have let yourself or your family down?     Trouble concentrating on things, such as reading the newspaper or watching television?     Moving or speaking so slowly that other people could have noticed? Or the opposite - being so fidgety or restless that you have been moving around a lot more than usual?     Thoughts that you would be better off dead, or of hurting yourself in some way?     PHQ-9 Total Score 0   If you checked off any problems, how difficult have these problems made it for you to do your work, take care of things at home, or get along with other people?           Dec 3  Concerned about a bump on the neck  No fever, no pain,           Review of Systems:   Review of Systems   All other systems reviewed and are negative.      Past Medical History:   Past Medical History:   Diagnosis Date    Allergic     Eczema     Healthy adult on routine physical examination     Urinary tract infection        Past Surgical History:   Past Surgical  History:   Procedure Laterality Date    WISDOM TOOTH EXTRACTION  2018       Family History:   Family History   Problem Relation Age of Onset    Thyroid disease Mother         hypo    Hypothyroidism Mother     No Known Problems Father     Nephrolithiasis Brother     Breast cancer Paternal Aunt 40    Cancer Paternal Aunt     Lung cancer Maternal Grandmother     Cancer Paternal Grandmother         Breast    Breast cancer Paternal Grandmother 65    Ovarian cancer Neg Hx     Uterine cancer Neg Hx     Endometrial cancer Neg Hx     Colon cancer Neg Hx        Social History:   Social History     Socioeconomic History    Marital status: Single     Spouse name: N/A    Number of children: 0    Years of education: College   Tobacco Use    Smoking status: Never    Smokeless tobacco: Never   Vaping Use    Vaping Use: Never used   Substance and Sexual Activity    Alcohol use: Yes     Alcohol/week: 2.0 standard drinks of alcohol     Types: 2 Drinks containing 0.5 oz of alcohol per week     Comment: very rarely    Drug use: No    Sexual activity: Yes     Partners: Male     Birth control/protection: Condom, OCP       Immunizations:   Immunization History   Administered Date(s) Administered    COVID-19 (PFIZER) Purple Cap Monovalent 02/26/2021, 03/20/2021, 01/21/2022    DTaP 2000, 2000, 01/26/2001, 11/29/2002, 07/12/2004    Flucelvax Quad Vial =>4yrs 11/14/2019    Fluzone (or Fluarix & Flulaval for VFC) >6mos 11/08/2021, 10/11/2022, 11/19/2023    HPV Quadrivalent 07/09/2011    Hep B, Unspecified 2000, 2000, 07/31/2001    Hepatitis A 06/03/2013, 08/05/2016    Hepatitis B 05/05/2022, 06/02/2022    Hepatitis B Adult/Adolescent IM 2000, 2000, 07/31/2001    HiB 2000, 2000, 01/26/2001, 07/31/2001    Hpv9 04/15/2022    IPV 2000, 2000, 01/26/2001, 07/12/2004    Influenza Injectable Mdck Pf Quad 11/08/2021    Influenza LAIV (Nasal) 12/01/2007, 08/06/2009, 09/25/2010    Influenza,  "Unspecified 11/14/2019    MMR 07/31/2001, 07/12/2004    Meningococcal B,(Bexsero) 08/05/2016    Meningococcal Polysaccharide 07/09/2011, 08/05/2016    Pneumococcal Conjugate 13-Valent (PCV13) 2000, 2000, 01/26/2001, 07/31/2001    Tdap 07/09/2011, 04/15/2022    Varicella 07/31/2001, 07/12/2004        Medications:     Current Outpatient Medications:     cetirizine (zyrTEC) 10 MG tablet, Take 1 tablet by mouth Daily., Disp: , Rfl:     norethindrone-ethinyl estradiol (Nortrel 1/35, 28,) 1-35 MG-MCG per tablet, Take 1 tablet by mouth Daily., Disp: , Rfl:     Allergies:   Allergies   Allergen Reactions    Chicken-Peas-Carrots [Ensure] Hives     (PEAS)    Nitrofurantoin Hives       Objective     Vital Signs  /70   Pulse 79   Temp 97.5 °F (36.4 °C) (Temporal)   Ht 162.1 cm (63.82\")   Wt 60.1 kg (132 lb 9.6 oz)   SpO2 100%   BMI 22.89 kg/m²   Estimated body mass index is 22.89 kg/m² as calculated from the following:    Height as of this encounter: 162.1 cm (63.82\").    Weight as of this encounter: 60.1 kg (132 lb 9.6 oz).    BMI is within normal parameters. No other follow-up for BMI required.      Physical Exam  Constitutional:       Appearance: Normal appearance.   Neck:     Cardiovascular:      Rate and Rhythm: Normal rate and regular rhythm.      Pulses: Normal pulses.      Heart sounds: Normal heart sounds.   Pulmonary:      Effort: Pulmonary effort is normal.      Breath sounds: Normal breath sounds.   Musculoskeletal:      Cervical back: Normal range of motion.   Lymphadenopathy:      Cervical: Cervical adenopathy present.      Right cervical: Posterior cervical adenopathy present.   Skin:     General: Skin is warm.   Neurological:      General: No focal deficit present.      Mental Status: She is alert and oriented to person, place, and time.          Result Review :                  Assessment and Plan     1. Well adult exam  The following were discussed at today's wellness visit today: " preventaitve: Nutrition, Physical activity, Healthy weight, Mental health, Immunizations, and Screenings    - TSH Rfx On Abnormal To Free T4; Future  - CBC w AUTO Differential; Future  - Comprehensive metabolic panel; Future    2. Enlarged lymph node in neck    - US Head Neck Soft Tissue; Future  - TSH Rfx On Abnormal To Free T4; Future  - CBC w AUTO Differential; Future  - Comprehensive metabolic panel; Future       Follow Up  No follow-ups on file.    Luis Lopez MD  [facility]  [facility]  [address]  [address] [address]  [phone]

## 2023-12-20 NOTE — PROGRESS NOTES
"Chief Complaint  Yessenia Gamez is a 23 y.o.  female presenting for Annual Exam and Med Refill (Nortrel  (1 pack with refills).  [facility] [address]    History of Present Illness  Yessenia is a 24yo, nulligravid woman, here today for annual gyn exam.  She has no surgical history of any gynecologic surgeries or procedures.  She has no contraindication to OCPs, nor any ACHES or bleeding problems with them.  She is in a stable & monogamous relationship x 9 years.    She declines any STD screening.  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  Otherwise, ROS negative.    Fam Hx of breast cancer is noted.    The following portions of the patient's history were reviewed and updated as appropriate: allergies, current medications, past family history, past medical history, past social history, past surgical history, and problem list.    Allergies   Allergen Reactions    Chicken-Peas-Carrots [Ensure] Hives     (PEAS)    Nitrofurantoin Hives         Current Outpatient Medications:     norethindrone-ethinyl estradiol (Nortrel 35, 28,) 1-35 MG-MCG per tablet, Take 1 tablet by mouth Daily., Disp: 28 tablet, Rfl: 12    cetirizine (zyrTEC) 10 MG tablet, Take 1 tablet by mouth Daily., Disp: , Rfl:     Past Medical History:   Diagnosis Date    Allergic     Eczema     Healthy adult on routine physical examination     Urinary tract infection         Past Surgical History:   Procedure Laterality Date    WISDOM TOOTH EXTRACTION         Objective  /76   Ht 162.1 cm (63.82\")   Wt 60 kg (132 lb 3.2 oz)   LMP 2023 (Exact Date)   Breastfeeding No   BMI 22.82 kg/m²     Physical Exam  Vitals and nursing note reviewed. Exam conducted with a chaperone present.   Constitutional:       General: She is not in acute distress.     Appearance: Normal appearance. She is not ill-appearing.   HENT:      Head: Normocephalic.   Neck:      Thyroid: No thyroid mass or thyromegaly.   Cardiovascular:      Rate " and Rhythm: Normal rate and regular rhythm.      Heart sounds: Normal heart sounds. No murmur heard.  Pulmonary:      Effort: Pulmonary effort is normal. No respiratory distress.      Breath sounds: Normal breath sounds.   Chest:   Breasts:     Right: No inverted nipple, mass or nipple discharge.      Left: No inverted nipple, mass or nipple discharge.   Abdominal:      Palpations: Abdomen is soft. There is no mass.      Tenderness: There is no abdominal tenderness.   Genitourinary:     General: Normal vulva.      Labia:         Right: No rash, tenderness or lesion.         Left: No rash, tenderness or lesion.       Vagina: Normal. No erythema.      Cervix: No discharge, lesion or erythema.      Uterus: Not enlarged and not tender.       Adnexa:         Right: No mass or tenderness.          Left: No mass or tenderness.        Comments: Anus appears wnl.  No rectal exam performed.  Lymphadenopathy:      Upper Body:      Right upper body: No supraclavicular or axillary adenopathy.      Left upper body: No supraclavicular or axillary adenopathy.   Skin:     General: Skin is warm and dry.   Neurological:      Mental Status: She is alert and oriented to person, place, and time.   Psychiatric:         Mood and Affect: Mood normal.         Behavior: Behavior normal.         Assessment/Plan   Diagnoses and all orders for this visit:    1. Encounter for gynecological examination without abnormal finding (Primary)    2. Encounter for surveillance of contraceptive pills    Other orders  -     norethindrone-ethinyl estradiol (Nortrel 1/35, 28,) 1-35 MG-MCG per tablet; Take 1 tablet by mouth Daily.  Dispense: 28 tablet; Refill: 12        Procedures    19 to 39: Counseling/Anticipatory Guidance Discussed: family planning/contraception and breast cancer and self breast exams    Return in about 1 year (around 12/20/2024) for Annual physical.    Marybeth Marcial, APRN  12/20/2023

## 2024-01-25 ENCOUNTER — HOSPITAL ENCOUNTER (OUTPATIENT)
Dept: ULTRASOUND IMAGING | Facility: HOSPITAL | Age: 24
Discharge: HOME | End: 2024-01-25
Admitting: STUDENT IN AN ORGANIZED HEALTH CARE EDUCATION/TRAINING PROGRAM
Payer: COMMERCIAL

## 2024-01-25 DIAGNOSIS — R59.0 ENLARGED LYMPH NODE IN NECK: ICD-10-CM

## 2024-01-25 PROCEDURE — 76536 US EXAM OF HEAD AND NECK: CPT

## 2024-12-23 ENCOUNTER — OFFICE VISIT (OUTPATIENT)
Dept: OBSTETRICS AND GYNECOLOGY | Facility: CLINIC | Age: 24
End: 2024-12-23
Payer: COMMERCIAL

## 2024-12-23 VITALS
RESPIRATION RATE: 16 BRPM | BODY MASS INDEX: 23.3 KG/M2 | WEIGHT: 135 LBS | DIASTOLIC BLOOD PRESSURE: 60 MMHG | SYSTOLIC BLOOD PRESSURE: 114 MMHG

## 2024-12-23 DIAGNOSIS — Z20.2 POSSIBLE EXPOSURE TO STI: ICD-10-CM

## 2024-12-23 DIAGNOSIS — Z01.419 ENCOUNTER FOR WELL WOMAN EXAM WITH ROUTINE GYNECOLOGICAL EXAM: Primary | ICD-10-CM

## 2024-12-23 RX ORDER — CEPHALEXIN 500 MG/1
500 CAPSULE ORAL 2 TIMES DAILY
COMMUNITY
Start: 2024-12-20

## 2024-12-23 NOTE — PROGRESS NOTES
Subjective   Chief Complaint   Patient presents with    Annual Exam     Yessenia Gamez is a 24 y.o. year old  presenting to be seen for her annual exam. She is overall feeling well.     SEXUAL Hx:  She is currently sexually active.  In the past year there there has been NO new sexual partners.    Condoms are always used.  She would like to be screened for STD's at today's exam.  Current birth control method: condoms and OCP (estrogen/progesterone).  She is not happy with her current method of contraception and  does not  want to discuss alternative methods of contraception- she would like to stop using her pills for a while. She reports no adverse affects but has been on them for 10 years.   MENSTRUAL Hx:  Patient's last menstrual period was 2024.  In the past 6 months her cycles have been regular, predictable and occur monthly.  Her menstrual flow is typically light.   Each month on average there are roughly 0 day(s) of very heavy flow.    Intermenstrual bleeding is absent.    Post-coital bleeding is present - occasionally in the past  but has reportedly a very friable cervix.  Dysmenorrhea: mild and is not affecting her activities of daily living  PMS: none and is not affecting her activities of daily living  Her cycles are not a source of concern for her that she wishes to discuss today.  HEALTH Hx:  She exercises regularly: yes. She goes to the gym as much as possible.   She wears her seat belt: yes.  She has concerns about domestic violence: no.  OTHER THINGS SHE WANTS TO DISCUSS TODAY:  Nothing else    The following portions of the patient's history were reviewed and updated as appropriate:problem list, current medications, allergies, past family history, past medical history, past social history, and past surgical history.    Social History    Tobacco Use      Smoking status: Never      Smokeless tobacco: Never      Review of Systems   Constitutional: Negative.  Negative for appetite change and  diaphoresis.   Respiratory: Negative.     Cardiovascular: Negative.    Gastrointestinal: Negative.  Negative for abdominal distention, blood in stool, GERD and indigestion.   Endocrine: Negative.    Genitourinary: Negative.  Negative for breast discharge, breast lump, breast pain, dysuria and hematuria.   Skin: Negative.           Objective   /60   Resp 16   Wt 61.2 kg (135 lb)   LMP 12/11/2024   BMI 23.30 kg/m²     Physical Exam  Vitals and nursing note reviewed. Exam conducted with a chaperone present.   Constitutional:       Appearance: Normal appearance.   Cardiovascular:      Rate and Rhythm: Normal rate and regular rhythm.      Heart sounds: Normal heart sounds.   Pulmonary:      Effort: Pulmonary effort is normal.      Breath sounds: Normal breath sounds.   Chest:   Breasts:     Right: Normal.      Left: Normal.   Abdominal:      Palpations: Abdomen is soft.   Genitourinary:     General: Normal vulva.      Exam position: Lithotomy position.      Vagina: Normal.      Cervix: Normal. Friability present.      Uterus: Normal.       Adnexa: Right adnexa normal and left adnexa normal.      Rectum: Normal.      Comments: Friability noted  Digital rectal exam not done but appears normal visually  Neurological:      Mental Status: She is alert and oriented to person, place, and time.   Psychiatric:         Mood and Affect: Mood normal.         Behavior: Behavior normal.         Thought Content: Thought content normal.         Judgment: Judgment normal.            Diagnoses and all orders for this visit:    1. Encounter for well woman exam with routine gynecological exam (Primary)  -     LIQUID-BASED PAP SMEAR WITH HPV GENOTYPING IF ASCUS ([facility]); Future  -     LIQUID-BASED PAP SMEAR WITH HPV GENOTYPING IF ASCUS ([facility])    2. Possible exposure to STI  -     LIQUID-BASED PAP SMEAR WITH HPV GENOTYPING IF ASCUS ([facility]); Future  -     LIQUID-BASED PAP SMEAR WITH HPV GENOTYPING IF ASCUS  ([facility])        No prescription was given or electronically sent at today's visit    The importance of keeping all planned follow-up and taking all medications as prescribed was emphasized.    Today I discussed with Yessenia the total recommended calcium intake for a premenopausal female is 1000 mg.  Ideally this should be from dietary sources.  I reviewed calcium content in various foods including milk, fortified orange juice and yogurt.  If she cannot get sufficient calcium through dietary means, it is recommended to supplement with either a multivitamin or calcium to reach her daily goal.  I also reviewed the difference in the bioavailability of calcium carbonate and calcium citrate containing supplements and the importance of taking calcium carbonate containing products with food.  Finally, vitamin D's role in calcium absorption was reviewed and a total daily vitamin D intake of 800 units was recommended.    I discussed with Yessenia that she may be behind on needed vaccinations for COVID booster / COVID bivalent booster.  She may be able to obtain these vaccinations at her local pharmacy OR speak about obtaining them with her primary care.  If she does obtain her vaccines, I have asked Yessenia to let us know the date each vaccine was obtained so that her medical record could be updated in our system.    No orders of the defined types were placed in this encounter.           Return in about 1 year (around 12/23/2025) for Annual physical.    Preethi Monson, VÍCTOR  December 23, 2024

## 2024-12-26 LAB — REF LAB TEST METHOD: NORMAL

## 2024-12-27 ENCOUNTER — OFFICE VISIT (OUTPATIENT)
Dept: INTERNAL MEDICINE | Facility: CLINIC | Age: 24
End: 2024-12-27
Payer: COMMERCIAL

## 2024-12-27 ENCOUNTER — LAB (OUTPATIENT)
Dept: INTERNAL MEDICINE | Facility: CLINIC | Age: 24
End: 2024-12-27
Payer: COMMERCIAL

## 2024-12-27 VITALS
TEMPERATURE: 98.2 F | BODY MASS INDEX: 23.46 KG/M2 | HEIGHT: 64 IN | SYSTOLIC BLOOD PRESSURE: 108 MMHG | WEIGHT: 137.4 LBS | OXYGEN SATURATION: 100 % | DIASTOLIC BLOOD PRESSURE: 64 MMHG | HEART RATE: 82 BPM

## 2024-12-27 DIAGNOSIS — N39.0 URINARY TRACT INFECTION WITHOUT HEMATURIA, SITE UNSPECIFIED: ICD-10-CM

## 2024-12-27 DIAGNOSIS — Z23 IMMUNIZATION DUE: ICD-10-CM

## 2024-12-27 DIAGNOSIS — Z00.00 WELL ADULT EXAM: Primary | ICD-10-CM

## 2024-12-27 LAB
B-HCG UR QL: NEGATIVE
BILIRUB BLD-MCNC: NEGATIVE MG/DL
CLARITY, POC: CLEAR
COLOR UR: YELLOW
EXPIRATION DATE: ABNORMAL
EXPIRATION DATE: NORMAL
GLUCOSE UR STRIP-MCNC: NEGATIVE MG/DL
INTERNAL NEGATIVE CONTROL: NORMAL
INTERNAL POSITIVE CONTROL: NORMAL
KETONES UR QL: NEGATIVE
LEUKOCYTE EST, POC: NEGATIVE
Lab: ABNORMAL
Lab: NORMAL
NITRITE UR-MCNC: NEGATIVE MG/ML
PH UR: 6 [PH] (ref 5–8)
PROT UR STRIP-MCNC: NEGATIVE MG/DL
RBC # UR STRIP: ABNORMAL /UL
SP GR UR: 1.01 (ref 1–1.03)
UROBILINOGEN UR QL: NORMAL

## 2024-12-27 PROCEDURE — 86317 IMMUNOASSAY INFECTIOUS AGENT: CPT | Performed by: STUDENT IN AN ORGANIZED HEALTH CARE EDUCATION/TRAINING PROGRAM

## 2024-12-27 PROCEDURE — 36415 COLL VENOUS BLD VENIPUNCTURE: CPT | Performed by: STUDENT IN AN ORGANIZED HEALTH CARE EDUCATION/TRAINING PROGRAM

## 2024-12-27 PROCEDURE — 87086 URINE CULTURE/COLONY COUNT: CPT | Performed by: STUDENT IN AN ORGANIZED HEALTH CARE EDUCATION/TRAINING PROGRAM

## 2024-12-27 NOTE — PROGRESS NOTES
Office Note     Name: Yessenia Gamez    : 2000     MRN: [id number]     Chief Complaint  Annual Exam    Subjective     History of Present Illness:  Yessenia Gamez is a 24 y.o. 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 Reports classes are doing well. She is interested in radiology.      Preventative health - family h/o breast PGM ( age 78 breast cancer) and Paunt; no fhx of colon cancer; last pap  normal,   denies tob/drug use;   1-2 etoh on weekends;   no early heart disease in famil;   UTD on immunizations    uses OCPs for contraception; no children; rides bike daily;     Has history of recurrent UTI  Recently treated for UTI, currently taking keflex.         Review of Systems:   Review of Systems   All other systems reviewed and are negative.      Past Medical History:   Past Medical History:   Diagnosis Date    Allergic     Eczema     Healthy adult on routine physical examination     Urinary tract infection        Past Surgical History:   Past Surgical History:   Procedure Laterality Date    WISDOM TOOTH EXTRACTION         Family History:   Family History   Problem Relation Age of Onset    Thyroid disease Mother         hypo    Hypothyroidism Mother     No Known Problems Father     Nephrolithiasis Brother     Breast cancer Paternal Aunt 40    Cancer Paternal Aunt     Lung cancer Maternal Grandmother     Cancer Paternal Grandmother         Breast    Breast cancer Paternal Grandmother 65    Ovarian cancer Neg Hx     Uterine cancer Neg Hx     Endometrial cancer Neg Hx     Colon cancer Neg Hx        Social History:   Social History     Socioeconomic History    Marital status: Single     Spouse name: N/A    Number of children: 0    Years of education: College   Tobacco Use    Smoking status: Never    Smokeless tobacco: Never   Vaping Use    Vaping status: Never Used   Substance and Sexual Activity    Alcohol use: Not Currently      Alcohol/week: 2.0 standard drinks of alcohol     Comment: very rarely    Drug use: No    Sexual activity: Yes     Partners: Male     Birth control/protection: Condom       Immunizations:   Immunization History   Administered Date(s) Administered    COVID-19 (PFIZER) 12YRS+ (COMIRNATY) 12/27/2024    COVID-19 (PFIZER) Purple Cap Monovalent 02/26/2021, 03/20/2021, 01/21/2022    DTaP 2000, 2000, 01/26/2001, 11/29/2002, 07/12/2004    FluMist 2-49yrs (Nasal) 12/01/2007, 08/06/2009, 09/25/2010    Flucelvax Quad Vial =>4yrs 11/14/2019    Fluzone  >6mos 09/25/2024    Fluzone (or Fluarix & Flulaval for VFC) >6mos 11/08/2021, 10/11/2022, 11/19/2023    HPV Quadrivalent 07/09/2011    Hep B, Unspecified 2000, 2000, 07/31/2001    Hepatitis A 06/03/2013, 08/05/2016    Hepatitis B 05/05/2022, 06/02/2022    Hepatitis B Adult/Adolescent IM 2000, 2000, 07/31/2001    HiB 2000, 2000, 01/26/2001, 07/31/2001    Hpv9 04/15/2022    IPV 2000, 2000, 01/26/2001, 07/12/2004    Influenza Injectable Mdck Pf Quad 11/08/2021    Influenza, Unspecified 11/14/2019    MMR 07/31/2001, 07/12/2004    Meningococcal B,(Bexsero) 08/05/2016    Meningococcal Polysaccharide 07/09/2011, 08/05/2016    Monkeypox/Smallpox ID (JYNNEOS) 05/15/2024    Pneumococcal Conjugate 13-Valent (PCV13) 2000, 2000, 01/26/2001, 07/31/2001    Tdap 07/09/2011, 04/15/2022    Varicella 07/31/2001, 07/12/2004        Medications:     Current Outpatient Medications:     cephalexin (KEFLEX) 500 MG capsule, Take 1 capsule by mouth 2 (Two) Times a Day., Disp: , Rfl:     cetirizine (zyrTEC) 10 MG tablet, Take 1 tablet by mouth Daily. (Patient not taking: Reported on 12/27/2024), Disp: , Rfl:     norethindrone-ethinyl estradiol (Nortrel 1/35, 28,) 1-35 MG-MCG per tablet, Take 1 tablet by mouth Daily. (Patient not taking: Reported on 12/27/2024), Disp: 28 tablet, Rfl: 12    Allergies:   Allergies   Allergen Reactions     "Chicken-Peas-Carrots [Ensure] Hives     (PEAS)    Nitrofurantoin Hives       Objective     Vital Signs  /64   Pulse 82   Temp 98.2 °F (36.8 °C) (Infrared)   Ht 162.1 cm (63.82\")   Wt 62.3 kg (137 lb 6.4 oz)   SpO2 100%   BMI 23.72 kg/m²   Estimated body mass index is 23.72 kg/m² as calculated from the following:    Height as of this encounter: 162.1 cm (63.82\").    Weight as of this encounter: 62.3 kg (137 lb 6.4 oz).    BMI is within normal parameters. No other follow-up for BMI required.      Physical Exam  Constitutional:       Appearance: Normal appearance.   Cardiovascular:      Rate and Rhythm: Normal rate and regular rhythm.      Pulses: Normal pulses.      Heart sounds: Normal heart sounds.   Pulmonary:      Effort: Pulmonary effort is normal.      Breath sounds: Normal breath sounds.   Neurological:      Mental Status: She is alert.          Result Review :                Results for orders placed or performed in visit on 12/27/24   POCT pregnancy, urine    Collection Time: 12/27/24  8:39 AM    Specimen: Urine   Result Value Ref Range    HCG, Urine, QL Negative Negative    Lot Number [id number]     Internal Positive Control Passed Positive, Passed    Internal Negative Control Passed Negative, Passed    Expiration Date 12/09/2025    POC Urinalysis Dipstick, Automated    Collection Time: 12/27/24  8:39 AM    Specimen: Urine   Result Value Ref Range    Color Yellow Yellow, Straw, Dark Yellow, Aure    Clarity, UA Clear Clear    Specific Gravity  1.010 1.005 - 1.030    pH, Urine 6.0 5.0 - 8.0    Leukocytes Negative Negative    Nitrite, UA Negative Negative    Protein, POC Negative Negative mg/dL    Glucose, UA Negative Negative mg/dL    Ketones, UA Negative Negative    Urobilinogen, UA Normal Normal, 0.2 E.U./dL    Bilirubin Negative Negative    Blood, UA Trace (A) Negative    Lot Number [id number]     Expiration Date 03/03/2026        Assessment and Plan     1. Well adult exam  The following were " discussed at today's wellness visit today: preventaitve: Nutrition, Physical activity, Healthy weight, Mental health, Immunizations, and Screenings      2. Immunization due    - COVID-19 (Pfizer) 12yrs+ (COMIRNATY)  - Hepatitis B Surf Antibody Quant; Future    3. Urinary tract infection without hematuria, site unspecified    If symptom return, come into lab for repeat UA and urine culture.     - POCT pregnancy, urine  - POC Urinalysis Dipstick, Automated  - Urine Culture - Urine, Urine, Clean Catch; Future       Follow Up  No follow-ups on file.    Luis Lopez MD  [facility]  [facility]  [address]  [address] [address] [phone]

## 2024-12-28 LAB
BACTERIA SPEC AEROBE CULT: NO GROWTH
HBV SURFACE AB SER-ACNC: <3.5 MIU/ML

## 2025-01-03 ENCOUNTER — PATIENT ROUNDING (BHMG ONLY) (OUTPATIENT)
Dept: OBSTETRICS AND GYNECOLOGY | Facility: CLINIC | Age: 25
End: 2025-01-03
Payer: COMMERCIAL

## 2025-01-03 NOTE — PROGRESS NOTES
My name is UmuLUCIE    I am with CRYSTAL KEVIN  [facility]  [address]  [place] [address] [phone]    I want to officially welcome you to our practice and ask about your recent visit.    Tell me about your visit with us.  What things went well?    We're always looking for ways to make our patients' experiences even better.  Do you have recommendations on ways we may improve?    Overall were you satisfied with your first visit to our practice?    I appreciate you taking the time to answer a few questions today.  Is there anything else I can do for you?    Thank you, and have a great day.

## 2025-02-11 ENCOUNTER — CLINICAL SUPPORT (OUTPATIENT)
Dept: INTERNAL MEDICINE | Facility: CLINIC | Age: 25
End: 2025-02-11
Payer: COMMERCIAL

## 2025-02-11 DIAGNOSIS — Z23 NEED FOR VACCINATION: Primary | ICD-10-CM

## 2025-02-11 PROCEDURE — 90471 IMMUNIZATION ADMIN: CPT | Performed by: PHYSICIAN ASSISTANT

## 2025-02-11 PROCEDURE — 90746 HEPB VACCINE 3 DOSE ADULT IM: CPT | Performed by: PHYSICIAN ASSISTANT

## 2025-02-19 ENCOUNTER — TELEPHONE (OUTPATIENT)
Dept: OBSTETRICS AND GYNECOLOGY | Facility: CLINIC | Age: 25
End: 2025-02-19
Payer: COMMERCIAL

## 2025-03-06 ENCOUNTER — LAB (OUTPATIENT)
Dept: INTERNAL MEDICINE | Facility: CLINIC | Age: 25
End: 2025-03-06
Payer: COMMERCIAL

## 2025-03-06 ENCOUNTER — OFFICE VISIT (OUTPATIENT)
Dept: INTERNAL MEDICINE | Facility: CLINIC | Age: 25
End: 2025-03-06
Payer: COMMERCIAL

## 2025-03-06 VITALS
OXYGEN SATURATION: 98 % | TEMPERATURE: 97.5 F | DIASTOLIC BLOOD PRESSURE: 50 MMHG | BODY MASS INDEX: 23.39 KG/M2 | HEART RATE: 85 BPM | SYSTOLIC BLOOD PRESSURE: 108 MMHG | WEIGHT: 137 LBS | HEIGHT: 64 IN

## 2025-03-06 DIAGNOSIS — Z23 IMMUNIZATION DUE: Primary | ICD-10-CM

## 2025-03-06 DIAGNOSIS — Z23 IMMUNIZATION DUE: ICD-10-CM

## 2025-03-06 DIAGNOSIS — Z11.1 SCREENING-PULMONARY TB: ICD-10-CM

## 2025-03-06 PROCEDURE — 36415 COLL VENOUS BLD VENIPUNCTURE: CPT | Performed by: STUDENT IN AN ORGANIZED HEALTH CARE EDUCATION/TRAINING PROGRAM

## 2025-03-06 PROCEDURE — 86317 IMMUNOASSAY INFECTIOUS AGENT: CPT | Performed by: STUDENT IN AN ORGANIZED HEALTH CARE EDUCATION/TRAINING PROGRAM

## 2025-03-06 PROCEDURE — 86480 TB TEST CELL IMMUN MEASURE: CPT | Performed by: STUDENT IN AN ORGANIZED HEALTH CARE EDUCATION/TRAINING PROGRAM

## 2025-03-06 NOTE — PROGRESS NOTES
Office Note     Name: Yessenia Gamez    : 2000     MRN: [id number]     Chief Complaint  paperwork  (Filled out for school and hep b titer and TB )    Subjective     History of Present Illness:  Yessenia Gamez is a 24 y.o. female who presents today for     Here for immunization and school paperwork    Review of Systems:   Review of Systems   All other systems reviewed and are negative.      Past Medical History:   Past Medical History:   Diagnosis Date    Allergic     Eczema     Healthy adult on routine physical examination     Urinary tract infection        Past Surgical History:   Past Surgical History:   Procedure Laterality Date    WISDOM TOOTH EXTRACTION  2018       Family History:   Family History   Problem Relation Age of Onset    Thyroid disease Mother         hypo    Hypothyroidism Mother     No Known Problems Father     Nephrolithiasis Brother     Breast cancer Paternal Aunt 40    Cancer Paternal Aunt     Lung cancer Maternal Grandmother     Cancer Paternal Grandmother         Breast    Breast cancer Paternal Grandmother 65    Ovarian cancer Neg Hx     Uterine cancer Neg Hx     Endometrial cancer Neg Hx     Colon cancer Neg Hx        Social History:   Social History     Socioeconomic History    Marital status: Single     Spouse name: N/A    Number of children: 0    Years of education: College   Tobacco Use    Smoking status: Never    Smokeless tobacco: Never   Vaping Use    Vaping status: Never Used   Substance and Sexual Activity    Alcohol use: Not Currently     Alcohol/week: 2.0 standard drinks of alcohol     Comment: very rarely    Drug use: No    Sexual activity: Yes     Partners: Male     Birth control/protection: Condom       Immunizations:   Immunization History   Administered Date(s) Administered    COVID-19 (PFIZER) 12YRS+ (COMIRNATY) 2024    COVID-19 (PFIZER) Purple Cap Monovalent 2021, 2021, 2022    DTaP 2000, 2000, 2001, 2002,  "07/12/2004    FluMist 2-49yrs (Nasal) 12/01/2007, 08/06/2009, 09/25/2010    Flucelvax Quad Vial =>4yrs 11/14/2019    Fluzone  >6mos 09/25/2024    Fluzone (or Fluarix & Flulaval for VFC) >6mos 11/08/2021, 10/11/2022, 11/19/2023    HPV Quadrivalent 07/09/2011    Hep B, Unspecified 2000, 2000, 07/31/2001    Hepatitis A 06/03/2013, 08/05/2016    Hepatitis B 05/05/2022, 06/02/2022    Hepatitis B Adult/Adolescent IM 2000, 2000, 07/31/2001, 02/11/2025    HiB 2000, 2000, 01/26/2001, 07/31/2001    Hpv9 04/15/2022    IPV 2000, 2000, 01/26/2001, 07/12/2004    Influenza Injectable Mdck Pf Quad 11/08/2021    Influenza, Unspecified 11/14/2019    MMR 07/31/2001, 07/12/2004    Meningococcal B,(Bexsero) 08/05/2016    Meningococcal Polysaccharide 07/09/2011, 08/05/2016    Monkeypox/Smallpox ID (JYNNEOS) 05/15/2024    Pneumococcal Conjugate 13-Valent (PCV13) 2000, 2000, 01/26/2001, 07/31/2001    Tdap 07/09/2011, 04/15/2022    Varicella 07/31/2001, 07/12/2004        Medications:     Current Outpatient Medications:     cephalexin (KEFLEX) 500 MG capsule, Take 1 capsule by mouth 2 (Two) Times a Day., Disp: , Rfl:     cetirizine (zyrTEC) 10 MG tablet, Take 1 tablet by mouth Daily. (Patient not taking: Reported on 3/6/2025), Disp: , Rfl:     Allergies:   Allergies   Allergen Reactions    Chicken-Peas-Carrots [Ensure] Hives     (PEAS)    Nitrofurantoin Hives       Objective     Vital Signs  /50   Pulse 85   Temp 97.5 °F (36.4 °C) (Temporal)   Ht 162.1 cm (63.82\")   Wt 62.1 kg (137 lb)   SpO2 98%   BMI 23.65 kg/m²   Estimated body mass index is 23.65 kg/m² as calculated from the following:    Height as of this encounter: 162.1 cm (63.82\").    Weight as of this encounter: 62.1 kg (137 lb).    BMI is within normal parameters. No other follow-up for BMI required.      Physical Exam  Constitutional:       Appearance: Normal appearance.   Cardiovascular:      Rate and Rhythm: " Normal rate and regular rhythm.      Pulses: Normal pulses.      Heart sounds: Normal heart sounds.   Pulmonary:      Effort: Pulmonary effort is normal.      Breath sounds: Normal breath sounds.   Neurological:      Mental Status: She is alert.          Result Review :                  Assessment and Plan     1. Immunization due  School paperwork filled out  - Hepatitis B Surf Antibody Quant; Future  - QuantiFERON-TB Gold Plus; Future    2. Screening-pulmonary TB      - QuantiFERON-TB Gold Plus; Future     I spent 20 minutes caring for Yessenia on this date of service. This time includes time spent by me in the following activities:preparing for the visit, reviewing tests, obtaining and/or reviewing a separately obtained history, performing a medically appropriate examination and/or evaluation , counseling and educating the patient/family/caregiver, ordering medications, tests, or procedures, and referring and communicating with other health care professionals       Follow Up  No follow-ups on file.    MD RONNELL [facility]E [facility] [address]  [facility]  [address] [id number] [phone]

## 2025-03-07 LAB — HBV SURFACE AB SER-ACNC: 72.9 MIU/ML

## 2025-03-08 LAB
GAMMA INTERFERON BACKGROUND BLD IA-ACNC: 0.05 IU/ML
M TB IFN-G BLD-IMP: NEGATIVE
M TB IFN-G CD4+ BCKGRND COR BLD-ACNC: 0.04 IU/ML
M TB IFN-G CD4+CD8+ BCKGRND COR BLD-ACNC: 0.05 IU/ML
MITOGEN IGNF BCKGRD COR BLD-ACNC: >10 IU/ML
QUANTIFERON INCUBATION: NORMAL
SERVICE CMNT-IMP: NORMAL

## 2025-04-03 ENCOUNTER — OFFICE VISIT (OUTPATIENT)
Dept: INTERNAL MEDICINE | Facility: CLINIC | Age: 25
End: 2025-04-03
Payer: COMMERCIAL

## 2025-04-03 VITALS
BODY MASS INDEX: 23.05 KG/M2 | DIASTOLIC BLOOD PRESSURE: 60 MMHG | TEMPERATURE: 97.3 F | SYSTOLIC BLOOD PRESSURE: 110 MMHG | WEIGHT: 135 LBS | OXYGEN SATURATION: 100 % | HEIGHT: 64 IN | HEART RATE: 56 BPM

## 2025-04-03 DIAGNOSIS — B00.1 RECURRENT COLD SORES: Primary | ICD-10-CM

## 2025-04-03 PROCEDURE — 99213 OFFICE O/P EST LOW 20 MIN: CPT | Performed by: STUDENT IN AN ORGANIZED HEALTH CARE EDUCATION/TRAINING PROGRAM

## 2025-04-03 RX ORDER — ONDANSETRON 4 MG/1
4 TABLET, ORALLY DISINTEGRATING ORAL EVERY 8 HOURS PRN
Qty: 30 TABLET | Refills: 0 | Status: SHIPPED | OUTPATIENT
Start: 2025-04-03

## 2025-04-03 RX ORDER — VALACYCLOVIR HYDROCHLORIDE 1 G/1
1000 TABLET, FILM COATED ORAL 2 TIMES DAILY
Qty: 30 TABLET | Refills: 1 | Status: SHIPPED | OUTPATIENT
Start: 2025-04-03

## 2025-04-03 NOTE — PROGRESS NOTES
Office Note     Name: Yessenia Gamez    : 2000     MRN: [id number]     Chief Complaint  Mouth Lesions    Subjective     History of Present Illness:  Yessenia Gamez is a 24 y.o. female who presents today for     Having an episode of oral cold sores near her lip.    Review of Systems:   Review of Systems   All other systems reviewed and are negative.      Past Medical History:   Past Medical History:   Diagnosis Date    Allergic     Eczema     Healthy adult on routine physical examination     Urinary tract infection     Varicella     Had chicken pox when younger       Past Surgical History:   Past Surgical History:   Procedure Laterality Date    WISDOM TOOTH EXTRACTION  2018       Family History:   Family History   Problem Relation Age of Onset    Thyroid disease Mother         Hypothyroid    Hypothyroidism Mother     Hyperlipidemia Father     Nephrolithiasis Brother     Breast cancer Paternal Aunt 40    Cancer Paternal Aunt     Lung cancer Maternal Grandmother     Cancer Paternal Grandmother         Breast    Breast cancer Paternal Grandmother 65    Ovarian cancer Neg Hx     Uterine cancer Neg Hx     Endometrial cancer Neg Hx     Colon cancer Neg Hx        Social History:   Social History     Socioeconomic History    Marital status: Single     Spouse name: N/A    Number of children: 0    Years of education: College   Tobacco Use    Smoking status: Never    Smokeless tobacco: Never   Vaping Use    Vaping status: Never Used   Substance and Sexual Activity    Alcohol use: Not Currently     Alcohol/week: 2.0 standard drinks of alcohol     Comment: very rarely    Drug use: No    Sexual activity: Yes     Partners: Male     Birth control/protection: Condom       Immunizations:   Immunization History   Administered Date(s) Administered    COVID-19 (PFIZER) 12YRS+ (COMIRNATY) 2024    COVID-19 (PFIZER) Purple Cap Monovalent 2021, 2021, 2022    DTaP 2000, 2000, 2001,  "11/29/2002, 07/12/2004    FluMist 2-49yrs (Nasal) 12/01/2007, 08/06/2009, 09/25/2010    Flucelvax Quad Vial =>4yrs 11/14/2019    Fluzone  >6mos 09/25/2024    Fluzone (or Fluarix & Flulaval for VFC) >6mos 11/08/2021, 10/11/2022, 11/19/2023    HPV Quadrivalent 07/09/2011    Hep B, Unspecified 2000, 2000, 07/31/2001    Hepatitis A 06/03/2013, 08/05/2016    Hepatitis B 05/05/2022, 06/02/2022    Hepatitis B Adult/Adolescent IM 2000, 2000, 07/31/2001, 02/11/2025    HiB 2000, 2000, 01/26/2001, 07/31/2001    Hpv9 04/15/2022    IPV 2000, 2000, 01/26/2001, 07/12/2004    Influenza Injectable Mdck Pf Quad 11/08/2021    Influenza, Unspecified 11/14/2019    MMR 07/31/2001, 07/12/2004    Meningococcal B,(Bexsero) 08/05/2016    Meningococcal Polysaccharide 07/09/2011, 08/05/2016    Monkeypox/Smallpox ID (JYNNEOS) 05/15/2024    Pneumococcal Conjugate 13-Valent (PCV13) 2000, 2000, 01/26/2001, 07/31/2001    Tdap 07/09/2011, 04/15/2022    Varicella 07/31/2001, 07/12/2004        Medications:     Current Outpatient Medications:     ondansetron ODT (ZOFRAN-ODT) 4 MG disintegrating tablet, Place 1 tablet on the tongue Every 8 (Eight) Hours As Needed for Nausea or Vomiting., Disp: 30 tablet, Rfl: 0    valACYclovir (Valtrex) 1000 MG tablet, Take 1 tablet by mouth 2 (Two) Times a Day., Disp: 30 tablet, Rfl: 1    Allergies:   Allergies   Allergen Reactions    Chicken-Peas-Carrots [Ensure] Hives     (PEAS)    Nitrofurantoin Hives       Objective     Vital Signs  /60   Pulse 56   Temp 97.3 °F (36.3 °C) (Temporal)   Ht 162.1 cm (63.82\")   Wt 61.2 kg (135 lb)   SpO2 100%   BMI 23.30 kg/m²   Estimated body mass index is 23.3 kg/m² as calculated from the following:    Height as of this encounter: 162.1 cm (63.82\").    Weight as of this encounter: 61.2 kg (135 lb).    BMI is within normal parameters. No other follow-up for BMI required.      Physical Exam  Constitutional:       " Appearance: Normal appearance.   Neurological:      Mental Status: She is alert.          Result Review :                  Assessment and Plan     1. Recurrent cold sores    - valACYclovir (Valtrex) 1000 MG tablet; Take 1 tablet by mouth 2 (Two) Times a Day.  Dispense: 30 tablet; Refill: 1  - ondansetron ODT (ZOFRAN-ODT) 4 MG disintegrating tablet; Place 1 tablet on the tongue Every 8 (Eight) Hours As Needed for Nausea or Vomiting.  Dispense: 30 tablet; Refill: 0       Follow Up  No follow-ups on file.    Luis Lopez MD  [facility] [address]  [facility]  [address]  [address] [address] [phone]